# Patient Record
Sex: FEMALE | Race: WHITE | NOT HISPANIC OR LATINO | Employment: STUDENT | RURAL
[De-identification: names, ages, dates, MRNs, and addresses within clinical notes are randomized per-mention and may not be internally consistent; named-entity substitution may affect disease eponyms.]

---
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
## 2022-01-22 ENCOUNTER — OFFICE VISIT (OUTPATIENT)
Dept: FAMILY MEDICINE | Facility: CLINIC | Age: 22
End: 2022-01-22
Payer: COMMERCIAL

## 2022-01-22 ENCOUNTER — LAB VISIT (OUTPATIENT)
Dept: FAMILY MEDICINE | Facility: CLINIC | Age: 22
End: 2022-01-22
Payer: COMMERCIAL

## 2022-01-22 VITALS
TEMPERATURE: 99 F | WEIGHT: 140 LBS | OXYGEN SATURATION: 98 % | SYSTOLIC BLOOD PRESSURE: 107 MMHG | BODY MASS INDEX: 23.32 KG/M2 | HEIGHT: 65 IN | HEART RATE: 82 BPM | DIASTOLIC BLOOD PRESSURE: 83 MMHG | RESPIRATION RATE: 16 BRPM

## 2022-01-22 DIAGNOSIS — Z11.52 ENCOUNTER FOR SCREENING FOR COVID-19: Primary | ICD-10-CM

## 2022-01-22 DIAGNOSIS — Z11.52 ENCOUNTER FOR SCREENING FOR COVID-19: ICD-10-CM

## 2022-01-22 DIAGNOSIS — U07.1 COVID-19: Primary | ICD-10-CM

## 2022-01-22 LAB
CTP QC/QA: YES
SARS-COV-2 AG RESP QL IA.RAPID: POSITIVE

## 2022-01-22 PROCEDURE — 99051 MED SERV EVE/WKEND/HOLIDAY: CPT | Mod: ,,, | Performed by: NURSE PRACTITIONER

## 2022-01-22 PROCEDURE — 99051 PR MEDICAL SERVICES, EVE/WKEND/HOLIDAY: ICD-10-PCS | Mod: ,,, | Performed by: NURSE PRACTITIONER

## 2022-01-22 PROCEDURE — 1159F PR MEDICATION LIST DOCUMENTED IN MEDICAL RECORD: ICD-10-PCS | Mod: ,,, | Performed by: NURSE PRACTITIONER

## 2022-01-22 PROCEDURE — 3008F BODY MASS INDEX DOCD: CPT | Mod: ,,, | Performed by: NURSE PRACTITIONER

## 2022-01-22 PROCEDURE — 99213 OFFICE O/P EST LOW 20 MIN: CPT | Mod: ,,, | Performed by: NURSE PRACTITIONER

## 2022-01-22 PROCEDURE — 3074F PR MOST RECENT SYSTOLIC BLOOD PRESSURE < 130 MM HG: ICD-10-PCS | Mod: ,,, | Performed by: NURSE PRACTITIONER

## 2022-01-22 PROCEDURE — 3079F PR MOST RECENT DIASTOLIC BLOOD PRESSURE 80-89 MM HG: ICD-10-PCS | Mod: ,,, | Performed by: NURSE PRACTITIONER

## 2022-01-22 PROCEDURE — 3008F PR BODY MASS INDEX (BMI) DOCUMENTED: ICD-10-PCS | Mod: ,,, | Performed by: NURSE PRACTITIONER

## 2022-01-22 PROCEDURE — 87426 SARSCOV CORONAVIRUS AG IA: CPT | Mod: QW,,, | Performed by: NURSE PRACTITIONER

## 2022-01-22 PROCEDURE — 87426 SARS CORONAVIRUS 2 ANTIGEN POCT: ICD-10-PCS | Mod: QW,,, | Performed by: NURSE PRACTITIONER

## 2022-01-22 PROCEDURE — 1159F MED LIST DOCD IN RCRD: CPT | Mod: ,,, | Performed by: NURSE PRACTITIONER

## 2022-01-22 PROCEDURE — 99213 PR OFFICE/OUTPT VISIT, EST, LEVL III, 20-29 MIN: ICD-10-PCS | Mod: ,,, | Performed by: NURSE PRACTITIONER

## 2022-01-22 PROCEDURE — 3079F DIAST BP 80-89 MM HG: CPT | Mod: ,,, | Performed by: NURSE PRACTITIONER

## 2022-01-22 PROCEDURE — 3074F SYST BP LT 130 MM HG: CPT | Mod: ,,, | Performed by: NURSE PRACTITIONER

## 2022-01-22 RX ORDER — METHYLPREDNISOLONE 4 MG/1
TABLET ORAL
Qty: 21 TABLET | Refills: 0 | Status: SHIPPED | OUTPATIENT
Start: 2022-01-22 | End: 2022-04-26

## 2022-01-22 RX ORDER — ALBUTEROL SULFATE 90 UG/1
2 AEROSOL, METERED RESPIRATORY (INHALATION) EVERY 6 HOURS PRN
Qty: 18 G | Refills: 0 | Status: SHIPPED | OUTPATIENT
Start: 2022-01-22 | End: 2022-04-26

## 2022-01-22 RX ORDER — AZITHROMYCIN 250 MG/1
TABLET, FILM COATED ORAL
Qty: 6 TABLET | Refills: 0 | Status: SHIPPED | OUTPATIENT
Start: 2022-01-22 | End: 2022-01-27

## 2022-01-22 NOTE — LETTER
January 22, 2022      [facility]  [address] [place] [address]  Phone: [phone]  Fax: [phone]       Patient: Ronit Morley   YOB: 2000  Date of Visit: 01/22/2022    To Whom It May Concern:    Yong Morley  was at [facility] on 01/22/2022. The patient may return to work/school on 01/27/2022 with no restrictions. If you have any questions or concerns, or if I can be of further assistance, please do not hesitate to contact me.    Sincerely,    GRIS Melgar

## 2022-01-22 NOTE — PROGRESS NOTES
[facility] - FAMILY MEDICINE  Phone: [phone]       PATIENT NAME: Ronit Morley   : 2000    AGE: 21 y.o. DATE: 2022    MRN: [id number]        Reason for Visit / Chief Complaint:  COVID-19 Concerns (Presents for COVID screening. Was told that she needed to have a COVID test before returning to school. Patient has not tested positive for COVID. Denies any symptoms or known exposure. )     Subjective:     HPI:  21 yr old WF presents to the office for covid testing to return to college   Denies any s/s or known exposure  reoprts she has been vaccinated     Review of Systems:    Pertinent items are above noted in HPI.  A comprehensive review of systems was negative     Review of patient's allergies indicates:   Allergen Reactions    Cefdinir Rash        Med List:  Current Outpatient Medications on File Prior to Visit   Medication Sig Dispense Refill    AVIANE 0.1-20 mg-mcg per tablet Take 1 tablet by mouth once daily 28 tablet 0    citalopram (CELEXA) 10 MG tablet Take 1 tablet by mouth once daily 90 tablet 0    metoprolol succinate (TOPROL-XL) 25 MG 24 hr tablet TAKE 1 TABLET BY MOUTH ONCE DAILY FOR HEART RATE 90 tablet 0     No current facility-administered medications on file prior to visit.       Medical/Social/Family History:  Past Medical History:   Diagnosis Date    Depression     Heart rate fast       Social History     Tobacco Use   Smoking Status Never Smoker   Smokeless Tobacco Never Used      Social History     Substance and Sexual Activity   Alcohol Use Never       History reviewed. No pertinent family history.   Past Surgical History:   Procedure Laterality Date    TYMPANOSTOMY TUBE PLACEMENT          Objective:      Vitals:    22 1024   BP: 107/83   BP Location: Left arm   Patient Position: Sitting   BP Method: Medium (Automatic)   Pulse: 82   Resp: 16   Temp: 99.3 °F (37.4 °C)   TempSrc: Oral   SpO2: 98%   Weight: 63.5 kg (140 lb)  "  Height: 5' 5" (1.651 m)     Body mass index is 23.3 kg/m².     Physical Exam:    General: well developed, well nourished    Head: normocephalic, atraumatic    Eyes: conjunctivae/corneas clear. PERRL.    Mouth/ENT: ENT exam normal, no neck nodes or sinus tendernessmucous membranes moist, pharynx normal without lesions Uvula is midline.     Neck: supple, symmetrical, trachea midline, no adenopathy and thyroid: not enlarged, symmetric, no tenderness/mass/nodules    Respiratory: unlabored respirations, no intercostal retractions or accessory muscle use, clear to auscultation without rales or wheezes    Cardiovascular: normal rate and regular rhythm, S1 and S2 normal, no murmurs noted;    Abdomen: soft, nontender    Musculoskeletal: negative; full range of motion, no tenderness, palpable spasm or pain on motion    Lymphadenopathy: No cervical or supraclavicular adenopathy    Neurological: normal without focal findings and mental status, speech normal, alert and oriented x3    Skin: Skin color, texture, turgor normal. No rashes or lesions    Psych: no auditory or visual hallucinations, no anxiety, no depression/worrying alot       Assessment:          ICD-10-CM ICD-9-CM   1. COVID-19  U07.1 079.89        Plan:       COVID-19  -     methylPREDNISolone (MEDROL DOSEPACK) 4 mg tablet; use as directed  Dispense: 21 tablet; Refill: 0  -     pyrilamine-chlophedianoL 12.5-12.5 mg/5 mL Liqd; Take 10 mLs by mouth every 8 (eight) hours as needed (cough).  Dispense: 240 mL; Refill: 0  -     albuterol (PROAIR HFA) 90 mcg/actuation inhaler; Inhale 2 puffs into the lungs every 6 (six) hours as needed for Wheezing. Rescue  Dispense: 18 g; Refill: 0  -     azithromycin (Z-EMERSON) 250 MG tablet; Take 2 tablets by mouth on day 1; Take 1 tablet by mouth on days 2-5  Dispense: 6 tablet; Refill: 0        Current Outpatient Medications:     AVIANE 0.1-20 mg-mcg per tablet, Take 1 tablet by mouth once daily, Disp: 28 tablet, Rfl: 0    " citalopram (CELEXA) 10 MG tablet, Take 1 tablet by mouth once daily, Disp: 90 tablet, Rfl: 0    metoprolol succinate (TOPROL-XL) 25 MG 24 hr tablet, TAKE 1 TABLET BY MOUTH ONCE DAILY FOR HEART RATE, Disp: 90 tablet, Rfl: 0    albuterol (PROAIR HFA) 90 mcg/actuation inhaler, Inhale 2 puffs into the lungs every 6 (six) hours as needed for Wheezing. Rescue, Disp: 18 g, Rfl: 0    azithromycin (Z-EMERSON) 250 MG tablet, Take 2 tablets by mouth on day 1; Take 1 tablet by mouth on days 2-5, Disp: 6 tablet, Rfl: 0    methylPREDNISolone (MEDROL DOSEPACK) 4 mg tablet, use as directed, Disp: 21 tablet, Rfl: 0    pyrilamine-chlophedianoL 12.5-12.5 mg/5 mL Liqd, Take 10 mLs by mouth every 8 (eight) hours as needed (cough)., Disp: 240 mL, Rfl: 0        New & refilled meds:  Requested Prescriptions     Signed Prescriptions Disp Refills    methylPREDNISolone (MEDROL DOSEPACK) 4 mg tablet 21 tablet 0     Sig: use as directed    pyrilamine-chlophedianoL 12.5-12.5 mg/5 mL Liqd 240 mL 0     Sig: Take 10 mLs by mouth every 8 (eight) hours as needed (cough).    albuterol (PROAIR HFA) 90 mcg/actuation inhaler 18 g 0     Sig: Inhale 2 puffs into the lungs every 6 (six) hours as needed for Wheezing. Rescue    azithromycin (Z-EMERSON) 250 MG tablet 6 tablet 0     Sig: Take 2 tablets by mouth on day 1; Take 1 tablet by mouth on days 2-5     Treatment Recommendations:    Orders and follow up as documented in patient record.  Rest, hydrate, tylenol otc prn, quarantine x 5 days  Return to clinic as needed.    Signature: AMBAR Zuniga

## 2022-03-09 RX ORDER — LEVONORGESTREL AND ETHINYL ESTRADIOL 0.1-0.02MG
1 KIT ORAL DAILY
Qty: 28 TABLET | Refills: 0 | Status: SHIPPED | OUTPATIENT
Start: 2022-03-09 | End: 2022-04-06 | Stop reason: SDUPTHER

## 2022-03-25 RX ORDER — CITALOPRAM 10 MG/1
10 TABLET ORAL DAILY
Qty: 30 TABLET | Refills: 0 | Status: SHIPPED | OUTPATIENT
Start: 2022-03-25 | End: 2023-06-12

## 2022-03-25 NOTE — TELEPHONE ENCOUNTER
----- Message from Mervat Doyle sent at 3/25/2022  8:49 AM CDT -----  Pt needs refill of citalopram (CELEXA) 10 MG tablet sent to [address] [place] [address]. Please call Pt @ [phone]

## 2022-04-06 DIAGNOSIS — Z30.41 SURVEILLANCE OF CONTRACEPTIVE PILL: Primary | ICD-10-CM

## 2022-04-06 RX ORDER — LEVONORGESTREL AND ETHINYL ESTRADIOL 0.1-0.02MG
1 KIT ORAL DAILY
Qty: 28 TABLET | Refills: 0 | Status: SHIPPED | OUTPATIENT
Start: 2022-04-06 | End: 2022-04-06 | Stop reason: ALTCHOICE

## 2022-04-07 ENCOUNTER — TELEPHONE (OUTPATIENT)
Dept: OBSTETRICS AND GYNECOLOGY | Facility: CLINIC | Age: 22
End: 2022-04-07
Payer: COMMERCIAL

## 2022-04-07 NOTE — TELEPHONE ENCOUNTER
----- Message from Sarah Pinto CNM sent at 4/6/2022  5:21 PM CDT -----  Please call her and tell her I sent a refill on Aviane.  She needs to schedule an appt. For a visit before she needs another refill.

## 2022-04-25 ENCOUNTER — OFFICE VISIT (OUTPATIENT)
Dept: OBSTETRICS AND GYNECOLOGY | Facility: CLINIC | Age: 22
End: 2022-04-25
Payer: COMMERCIAL

## 2022-04-25 VITALS
DIASTOLIC BLOOD PRESSURE: 87 MMHG | WEIGHT: 138.63 LBS | BODY MASS INDEX: 23.1 KG/M2 | OXYGEN SATURATION: 99 % | SYSTOLIC BLOOD PRESSURE: 124 MMHG | HEIGHT: 65 IN | HEART RATE: 86 BPM

## 2022-04-25 DIAGNOSIS — Z30.41 SURVEILLANCE OF CONTRACEPTIVE PILL: Primary | ICD-10-CM

## 2022-04-25 PROCEDURE — 99212 PR OFFICE/OUTPT VISIT, EST, LEVL II, 10-19 MIN: ICD-10-PCS | Mod: ,,, | Performed by: ADVANCED PRACTICE MIDWIFE

## 2022-04-25 PROCEDURE — 3008F BODY MASS INDEX DOCD: CPT | Mod: ,,, | Performed by: ADVANCED PRACTICE MIDWIFE

## 2022-04-25 PROCEDURE — 1159F MED LIST DOCD IN RCRD: CPT | Mod: ,,, | Performed by: ADVANCED PRACTICE MIDWIFE

## 2022-04-25 PROCEDURE — 3079F DIAST BP 80-89 MM HG: CPT | Mod: ,,, | Performed by: ADVANCED PRACTICE MIDWIFE

## 2022-04-25 PROCEDURE — 3074F PR MOST RECENT SYSTOLIC BLOOD PRESSURE < 130 MM HG: ICD-10-PCS | Mod: ,,, | Performed by: ADVANCED PRACTICE MIDWIFE

## 2022-04-25 PROCEDURE — 3079F PR MOST RECENT DIASTOLIC BLOOD PRESSURE 80-89 MM HG: ICD-10-PCS | Mod: ,,, | Performed by: ADVANCED PRACTICE MIDWIFE

## 2022-04-25 PROCEDURE — 1159F PR MEDICATION LIST DOCUMENTED IN MEDICAL RECORD: ICD-10-PCS | Mod: ,,, | Performed by: ADVANCED PRACTICE MIDWIFE

## 2022-04-25 PROCEDURE — 3074F SYST BP LT 130 MM HG: CPT | Mod: ,,, | Performed by: ADVANCED PRACTICE MIDWIFE

## 2022-04-25 PROCEDURE — 3008F PR BODY MASS INDEX (BMI) DOCUMENTED: ICD-10-PCS | Mod: ,,, | Performed by: ADVANCED PRACTICE MIDWIFE

## 2022-04-25 PROCEDURE — 99212 OFFICE O/P EST SF 10 MIN: CPT | Mod: ,,, | Performed by: ADVANCED PRACTICE MIDWIFE

## 2022-04-25 RX ORDER — LEVONORGESTREL AND ETHINYL ESTRADIOL 0.1-0.02MG
1 KIT ORAL DAILY
Qty: 30 TABLET | Refills: 11 | Status: SHIPPED | OUTPATIENT
Start: 2022-04-25 | End: 2023-04-28

## 2022-04-25 NOTE — PROGRESS NOTES
"Patient ID:  Ronit Morley is a 21 y.o. female      Chief Complaint:   Chief Complaint   Patient presents with    Contraception        HPI:  Ronit is in for refill on OCP. She denies ACHES, vag discharge, and abnormal vag bleeding. No complaints voiced. She is a nursing student at [employer] [place]   LMP/contraceptive: Patient's last menstrual period was 2022. On Aviane OCP, desires to continue.  Sexually active:  Yes, declines STD testing      Past Medical History:   Diagnosis Date    Depression     Heart rate fast      Past Surgical History:   Procedure Laterality Date    TYMPANOSTOMY TUBE PLACEMENT         OB History        0    Para   0    Term   0       0    AB   0    Living   0       SAB   0    IAB   0    Ectopic   0    Multiple   0    Live Births   0                 /87   Pulse 86   Ht 5' 5" (1.651 m)   Wt 62.9 kg (138 lb 9.6 oz)   LMP 2022   SpO2 99%   BMI 23.06 kg/m²   Wt Readings from Last 3 Encounters:   22 62.9 kg (138 lb 9.6 oz)   22 63.5 kg (140 lb)   21 61.7 kg (136 lb)          ROS:  Review of Systems   Constitutional: Negative.    Eyes: Negative.    Respiratory: Negative.    Cardiovascular: Negative.    Gastrointestinal: Negative.    Endocrine: Negative.    Genitourinary: Negative.    Musculoskeletal: Negative.    Integumentary:  Negative.   Neurological: Negative.    Hematological: Negative.    Psychiatric/Behavioral: Negative.    Breast: negative.           PHYSICAL EXAM:  Physical Exam  Constitutional:       Appearance: Normal appearance. She is normal weight.   HENT:      Head: Normocephalic.      Nose: Nose normal.   Eyes:      Extraocular Movements: Extraocular movements intact.   Cardiovascular:      Rate and Rhythm: Normal rate and regular rhythm.      Pulses: Normal pulses.      Heart sounds: Normal heart sounds.   Pulmonary:      Effort: Pulmonary effort is normal.      Breath sounds: Normal breath sounds.   Abdominal:      Palpations: Abdomen " is soft.   Musculoskeletal:         General: Normal range of motion.      Cervical back: Normal range of motion.   Skin:     General: Skin is warm and dry.   Neurological:      Mental Status: She is alert and oriented to person, place, and time.   Psychiatric:         Mood and Affect: Mood normal.         Behavior: Behavior normal.         Thought Content: Thought content normal.         Judgment: Judgment normal.          Assessment:  Surveillance of contraceptive pill  -     levonorgestrel-ethinyl estradiol (AVIANE) 0.1-20 mg-mcg per tablet; Take 1 tablet by mouth once daily.  Dispense: 30 tablet; Refill: 11          ICD-10-CM ICD-9-CM    1. Surveillance of contraceptive pill  Z30.41 V25.41 levonorgestrel-ethinyl estradiol (AVIANE) 0.1-20 mg-mcg per tablet       Plan:  Refills of Aviane OCP sent. Reviewed ACHES and possible s/e  Encouraged use of condoms with each sexual encounter    Follow up in about 1 year (around 4/25/2023) for annaual and surveillance of OCP.

## 2022-04-26 ENCOUNTER — OFFICE VISIT (OUTPATIENT)
Dept: FAMILY MEDICINE | Facility: CLINIC | Age: 22
End: 2022-04-26
Payer: COMMERCIAL

## 2022-04-26 VITALS
DIASTOLIC BLOOD PRESSURE: 85 MMHG | RESPIRATION RATE: 18 BRPM | TEMPERATURE: 99 F | SYSTOLIC BLOOD PRESSURE: 117 MMHG | OXYGEN SATURATION: 99 % | BODY MASS INDEX: 23.03 KG/M2 | WEIGHT: 138.38 LBS | HEART RATE: 77 BPM

## 2022-04-26 DIAGNOSIS — K58.1 IRRITABLE BOWEL SYNDROME WITH CONSTIPATION: Primary | ICD-10-CM

## 2022-04-26 PROCEDURE — 3008F BODY MASS INDEX DOCD: CPT | Mod: ,,, | Performed by: NURSE PRACTITIONER

## 2022-04-26 PROCEDURE — 99214 PR OFFICE/OUTPT VISIT, EST, LEVL IV, 30-39 MIN: ICD-10-PCS | Mod: ,,, | Performed by: NURSE PRACTITIONER

## 2022-04-26 PROCEDURE — 1160F PR REVIEW ALL MEDS BY PRESCRIBER/CLIN PHARMACIST DOCUMENTED: ICD-10-PCS | Mod: ,,, | Performed by: NURSE PRACTITIONER

## 2022-04-26 PROCEDURE — 1159F PR MEDICATION LIST DOCUMENTED IN MEDICAL RECORD: ICD-10-PCS | Mod: ,,, | Performed by: NURSE PRACTITIONER

## 2022-04-26 PROCEDURE — 3074F PR MOST RECENT SYSTOLIC BLOOD PRESSURE < 130 MM HG: ICD-10-PCS | Mod: ,,, | Performed by: NURSE PRACTITIONER

## 2022-04-26 PROCEDURE — 3079F PR MOST RECENT DIASTOLIC BLOOD PRESSURE 80-89 MM HG: ICD-10-PCS | Mod: ,,, | Performed by: NURSE PRACTITIONER

## 2022-04-26 PROCEDURE — 3074F SYST BP LT 130 MM HG: CPT | Mod: ,,, | Performed by: NURSE PRACTITIONER

## 2022-04-26 PROCEDURE — 3008F PR BODY MASS INDEX (BMI) DOCUMENTED: ICD-10-PCS | Mod: ,,, | Performed by: NURSE PRACTITIONER

## 2022-04-26 PROCEDURE — 99214 OFFICE O/P EST MOD 30 MIN: CPT | Mod: ,,, | Performed by: NURSE PRACTITIONER

## 2022-04-26 PROCEDURE — 1160F RVW MEDS BY RX/DR IN RCRD: CPT | Mod: ,,, | Performed by: NURSE PRACTITIONER

## 2022-04-26 PROCEDURE — 3079F DIAST BP 80-89 MM HG: CPT | Mod: ,,, | Performed by: NURSE PRACTITIONER

## 2022-04-26 PROCEDURE — 1159F MED LIST DOCD IN RCRD: CPT | Mod: ,,, | Performed by: NURSE PRACTITIONER

## 2022-04-26 NOTE — PROGRESS NOTES
Kellee Santiago DNP, GRIS    [address] [place] [address]     PATIENT NAME: Ronit Morley  : 2000  DATE: 22  MRN: [id number]      Billing Provider: Kellee Santiago DNP, GRIS  Level of Service:   Patient PCP Information     Provider PCP Type    GRIS [facility]          Reason for Visit / Chief Complaint: Constipation (Patient complains of constipation and abdominal pain. She states the abdominal pain in usually on the right side, but sometimes on the left. She states she has tried every laxative she knows of and nothing helps at all. She has had this constipation on and off for a few months now. A couple times she has noticed blood in her stool.)       Update PCP  Update Chief Complaint         History of Present Illness / Problem Focused Workflow     Ronit Morley presents to the clinic with Constipation (Patient complains of constipation and abdominal pain. She states the abdominal pain in usually on the right side, but sometimes on the left. She states she has tried every laxative she knows of and nothing helps at all. She has had this constipation on and off for a few months now. A couple times she has noticed blood in her stool.)     Pt c/o constipation that has worsened over the past 4 months. Pt has tried over the counter meds including stool softeners and miralax. Pt continues to have difficulty having a bowel movement.     Mom and sister have hx IBS-C.       Review of Systems     Review of Systems   Constitutional: Negative for activity change, appetite change, chills, fatigue and fever.   HENT: Negative for nasal congestion, ear pain, hearing loss, postnasal drip and sore throat.    Respiratory: Negative for cough, chest tightness, shortness of breath and wheezing.    Cardiovascular: Negative for chest pain, palpitations, leg swelling and claudication.   Gastrointestinal: Positive for abdominal pain, blood in stool and constipation.  Negative for change in bowel habit, diarrhea, nausea, vomiting and change in bowel habit.   Genitourinary: Negative for dysuria.   Musculoskeletal: Negative for arthralgias, back pain, gait problem and myalgias.   Integumentary:  Negative for rash.   Neurological: Negative for weakness and headaches.   Psychiatric/Behavioral: Negative for suicidal ideas. The patient is not nervous/anxious.         Medical / Social / Family History     Past Medical History:   Diagnosis Date    Depression     Heart rate fast        Past Surgical History:   Procedure Laterality Date    TYMPANOSTOMY TUBE PLACEMENT         Social History  Ms. Ronit Morley  reports that she has never smoked. She has never used smokeless tobacco. She reports that she does not drink alcohol and does not use drugs.    Family History  Ms. Ronit Morley's family history is not on file.    Medications and Allergies     Medications  Outpatient Medications Marked as Taking for the 4/26/22 encounter (Office Visit) with Kellee Santiago, DORIS, FNP   Medication Sig Dispense Refill    citalopram (CELEXA) 10 MG tablet Take 1 tablet (10 mg total) by mouth once daily. 30 tablet 0    levonorgestrel-ethinyl estradiol (AVIANE) 0.1-20 mg-mcg per tablet Take 1 tablet by mouth once daily. 30 tablet 11    metoprolol succinate (TOPROL-XL) 25 MG 24 hr tablet TAKE 1 TABLET BY MOUTH ONCE DAILY FOR HEART RATE 90 tablet 0       Allergies  Review of patient's allergies indicates:   Allergen Reactions    Cefdinir Rash       Physical Examination     Vitals:    04/26/22 0834   BP: 117/85   Pulse: 77   Resp: 18   Temp: 98.8 °F (37.1 °C)     Physical Exam  Vitals and nursing note reviewed.   Constitutional:       General: She is not in acute distress.     Appearance: Normal appearance. She is not ill-appearing.   HENT:      Nose: Nose normal.      Mouth/Throat:      Mouth: Mucous membranes are moist.   Eyes:      Extraocular Movements: Extraocular movements intact.      Pupils: Pupils  are equal, round, and reactive to light.   Cardiovascular:      Rate and Rhythm: Normal rate and regular rhythm.      Pulses: Normal pulses.      Heart sounds: Normal heart sounds. No murmur heard.  Pulmonary:      Effort: Pulmonary effort is normal. No respiratory distress.      Breath sounds: Normal breath sounds. No wheezing, rhonchi or rales.   Chest:      Chest wall: No tenderness.   Abdominal:      General: Bowel sounds are normal. There is no distension.      Palpations: Abdomen is soft. There is no mass.      Tenderness: There is abdominal tenderness. There is no guarding or rebound.      Hernia: No hernia is present.   Musculoskeletal:         General: Normal range of motion.      Cervical back: Normal range of motion and neck supple.      Right lower leg: No edema.      Left lower leg: No edema.   Skin:     General: Skin is warm and dry.      Findings: No rash.   Neurological:      General: No focal deficit present.      Mental Status: She is alert and oriented to person, place, and time. Mental status is at baseline.   Psychiatric:         Mood and Affect: Mood normal.         Behavior: Behavior normal.          Assessment and Plan (including Health Maintenance)      Problem List  Smart Sets  Document Outside HM   :    Plan:         Health Maintenance Due   Topic Date Due    Hepatitis C Screening  Never done    Lipid Panel  Never done    COVID-19 Vaccine (1) Never done    HPV Vaccines (1 - 2-dose series) Never done    HIV Screening  Never done    Chlamydia Screening  Never done    TETANUS VACCINE  Never done    Influenza Vaccine (1) Never done       Problem List Items Addressed This Visit    None     Visit Diagnoses     Irritable bowel syndrome with constipation    -  Primary    Relevant Medications    linaCLOtide (LINZESS) 145 mcg Cap capsule        Irritable bowel syndrome with constipation  -     linaCLOtide (LINZESS) 145 mcg Cap capsule; Take 1 capsule (145 mcg total) by mouth before  breakfast.  Dispense: 30 capsule; Refill: 1       The patient has no Health Maintenance topics of status Not Due        Future Appointments   Date Time Provider Department Center   5/26/2022  9:30 AM Kellee Santiago DNP, GRIS [facility]        Follow up in about 4 weeks (around 5/24/2022).     Signature:  Kellee Santiago DNP, GRIS  [address] [place] [address]  Phone #: [phone]  Fax #: [phone]    Date of encounter: 4/26/22    Patient Instructions   Drink 1 bottle of magnesium citrate today and repeat tomorrow if no results. Increase water intake. High fiber diet. Take Linzess daily. Follow up in 1 month.

## 2022-04-26 NOTE — PATIENT INSTRUCTIONS
Drink 1 bottle of magnesium citrate today and repeat tomorrow if no results. Increase water intake. High fiber diet. Take Linzess daily. Follow up in 1 month.

## 2022-05-26 ENCOUNTER — OFFICE VISIT (OUTPATIENT)
Dept: FAMILY MEDICINE | Facility: CLINIC | Age: 22
End: 2022-05-26
Payer: COMMERCIAL

## 2022-05-26 VITALS
DIASTOLIC BLOOD PRESSURE: 80 MMHG | HEART RATE: 105 BPM | OXYGEN SATURATION: 100 % | BODY MASS INDEX: 22.82 KG/M2 | WEIGHT: 137 LBS | TEMPERATURE: 97 F | HEIGHT: 65 IN | SYSTOLIC BLOOD PRESSURE: 135 MMHG | RESPIRATION RATE: 17 BRPM

## 2022-05-26 DIAGNOSIS — K58.1 IRRITABLE BOWEL SYNDROME WITH CONSTIPATION: ICD-10-CM

## 2022-05-26 PROCEDURE — 99214 OFFICE O/P EST MOD 30 MIN: CPT | Mod: ,,, | Performed by: NURSE PRACTITIONER

## 2022-05-26 PROCEDURE — 3079F PR MOST RECENT DIASTOLIC BLOOD PRESSURE 80-89 MM HG: ICD-10-PCS | Mod: ,,, | Performed by: NURSE PRACTITIONER

## 2022-05-26 PROCEDURE — 1160F RVW MEDS BY RX/DR IN RCRD: CPT | Mod: ,,, | Performed by: NURSE PRACTITIONER

## 2022-05-26 PROCEDURE — 3008F BODY MASS INDEX DOCD: CPT | Mod: ,,, | Performed by: NURSE PRACTITIONER

## 2022-05-26 PROCEDURE — 3008F PR BODY MASS INDEX (BMI) DOCUMENTED: ICD-10-PCS | Mod: ,,, | Performed by: NURSE PRACTITIONER

## 2022-05-26 PROCEDURE — 3079F DIAST BP 80-89 MM HG: CPT | Mod: ,,, | Performed by: NURSE PRACTITIONER

## 2022-05-26 PROCEDURE — 1160F PR REVIEW ALL MEDS BY PRESCRIBER/CLIN PHARMACIST DOCUMENTED: ICD-10-PCS | Mod: ,,, | Performed by: NURSE PRACTITIONER

## 2022-05-26 PROCEDURE — 1159F PR MEDICATION LIST DOCUMENTED IN MEDICAL RECORD: ICD-10-PCS | Mod: ,,, | Performed by: NURSE PRACTITIONER

## 2022-05-26 PROCEDURE — 99214 PR OFFICE/OUTPT VISIT, EST, LEVL IV, 30-39 MIN: ICD-10-PCS | Mod: ,,, | Performed by: NURSE PRACTITIONER

## 2022-05-26 PROCEDURE — 1159F MED LIST DOCD IN RCRD: CPT | Mod: ,,, | Performed by: NURSE PRACTITIONER

## 2022-05-26 PROCEDURE — 3075F SYST BP GE 130 - 139MM HG: CPT | Mod: ,,, | Performed by: NURSE PRACTITIONER

## 2022-05-26 PROCEDURE — 3075F PR MOST RECENT SYSTOLIC BLOOD PRESS GE 130-139MM HG: ICD-10-PCS | Mod: ,,, | Performed by: NURSE PRACTITIONER

## 2022-05-26 NOTE — PROGRESS NOTES
Kellee Santiago DNP, GRIS    [address] [place] [address]     PATIENT NAME: Ronit Morley  : 2000  DATE: 22  MRN: [id number]      Billing Provider: Kellee Santiago DNP, FNP  Level of Service:   Patient PCP Information     Provider PCP Type    GRIS [facility]          Reason for Visit / Chief Complaint: Follow-up (Patient is here today for a follow up for new medication she is taking  )       Update PCP  Update Chief Complaint         History of Present Illness / Problem Focused Workflow     Ronit Morley presents to the clinic with Follow-up (Patient is here today for a follow up for new medication she is taking  )     Pt here for follow up after starting Linzess 145 mg. Pt states med has worked well when she was taking every day for approx 2 weeks. Pt then went on vacation and did not take med. Pt has started back approx 2 days ago.       Review of Systems     Review of Systems   Constitutional: Negative for activity change and unexpected weight change.   HENT: Negative for hearing loss, rhinorrhea and trouble swallowing.    Eyes: Negative for discharge and visual disturbance.   Respiratory: Negative for chest tightness and wheezing.    Cardiovascular: Negative for chest pain and palpitations.   Gastrointestinal: Positive for constipation. Negative for blood in stool, diarrhea and vomiting.   Endocrine: Negative for polydipsia and polyuria.   Genitourinary: Negative for difficulty urinating, dysuria, hematuria and menstrual problem.   Musculoskeletal: Negative for arthralgias, joint swelling and neck pain.   Neurological: Negative for weakness and headaches.   Psychiatric/Behavioral: Negative for confusion and dysphoric mood.        Medical / Social / Family History     Past Medical History:   Diagnosis Date    Depression     Heart rate fast        Past Surgical History:   Procedure Laterality Date    TYMPANOSTOMY TUBE PLACEMENT          Social History  Ms. Ronit Morley  reports that she has never smoked. She has never used smokeless tobacco. She reports that she does not drink alcohol and does not use drugs.    Family History  Ms. Ronit Morley's family history is not on file.    Medications and Allergies     Medications  Outpatient Medications Marked as Taking for the 5/26/22 encounter (Office Visit) with Kellee Santiago, DORIS, FNP   Medication Sig Dispense Refill    citalopram (CELEXA) 10 MG tablet Take 1 tablet (10 mg total) by mouth once daily. 30 tablet 0    levonorgestrel-ethinyl estradiol (AVIANE) 0.1-20 mg-mcg per tablet Take 1 tablet by mouth once daily. 30 tablet 11    metoprolol succinate (TOPROL-XL) 25 MG 24 hr tablet TAKE 1 TABLET BY MOUTH ONCE DAILY FOR HEART RATE 90 tablet 0    [DISCONTINUED] linaCLOtide (LINZESS) 145 mcg Cap capsule Take 1 capsule (145 mcg total) by mouth before breakfast. 30 capsule 1       Allergies  Review of patient's allergies indicates:   Allergen Reactions    Cefdinir Rash       Physical Examination     Vitals:    05/26/22 0928   BP: 135/80   Pulse: 105   Resp: 17   Temp: 97 °F (36.1 °C)     Physical Exam  Vitals and nursing note reviewed.   Constitutional:       General: She is not in acute distress.  HENT:      Nose: Nose normal.      Mouth/Throat:      Mouth: Mucous membranes are moist.   Eyes:      Pupils: Pupils are equal, round, and reactive to light.   Cardiovascular:      Rate and Rhythm: Normal rate and regular rhythm.      Pulses: Normal pulses.      Heart sounds: Normal heart sounds. No murmur heard.  Pulmonary:      Effort: Pulmonary effort is normal. No respiratory distress.      Breath sounds: Normal breath sounds. No wheezing, rhonchi or rales.   Chest:      Chest wall: No tenderness.   Abdominal:      General: Bowel sounds are normal.      Palpations: Abdomen is soft.      Tenderness: There is abdominal tenderness (mild tenderness).   Musculoskeletal:         General: Normal range of  motion.      Cervical back: Normal range of motion and neck supple.      Right lower leg: No edema.      Left lower leg: No edema.   Skin:     General: Skin is warm and dry.   Neurological:      General: No focal deficit present.      Mental Status: She is alert and oriented to person, place, and time.          Assessment and Plan (including Health Maintenance)      Problem List  Smart Sets  Document Outside HM   :    Plan:         Health Maintenance Due   Topic Date Due    TETANUS VACCINE  Never done    COVID-19 Vaccine (3 - Booster) 01/23/2022       Problem List Items Addressed This Visit    None     Visit Diagnoses     Irritable bowel syndrome with constipation        Relevant Medications    linaCLOtide (LINZESS) 145 mcg Cap capsule        Irritable bowel syndrome with constipation  -     linaCLOtide (LINZESS) 145 mcg Cap capsule; Take 1 capsule (145 mcg total) by mouth before breakfast.  Dispense: 90 capsule; Refill: 1       Health Maintenance Topics with due status: Not Due       Topic Last Completion Date    Influenza Vaccine Not Due       Procedures     Future Appointments   Date Time Provider Department Center   12/7/2022  9:30 AM Kellee Santiago DNP, GRIS [facility]        Follow up in about 6 months (around 11/26/2022).     Signature:  Kellee Santiago DNP, GRIS  [address] [place] [address]  Phone #: [phone]  Fax #: [phone]    Date of encounter: 5/26/22    Patient Instructions   Recommend adding otc probiotic to Linzess. Increase water intake.

## 2023-04-28 ENCOUNTER — OFFICE VISIT (OUTPATIENT)
Dept: OBSTETRICS AND GYNECOLOGY | Facility: CLINIC | Age: 23
End: 2023-04-28
Payer: COMMERCIAL

## 2023-04-28 VITALS
BODY MASS INDEX: 23.69 KG/M2 | OXYGEN SATURATION: 98 % | HEIGHT: 65 IN | SYSTOLIC BLOOD PRESSURE: 116 MMHG | HEART RATE: 102 BPM | WEIGHT: 142.19 LBS | TEMPERATURE: 98 F | DIASTOLIC BLOOD PRESSURE: 78 MMHG

## 2023-04-28 DIAGNOSIS — Z30.015 ENCOUNTER FOR INITIAL PRESCRIPTION OF VAGINAL RING HORMONAL CONTRACEPTIVE: Primary | ICD-10-CM

## 2023-04-28 DIAGNOSIS — F45.41 STRESS HEADACHES: ICD-10-CM

## 2023-04-28 PROCEDURE — 3008F BODY MASS INDEX DOCD: CPT | Mod: ,,, | Performed by: ADVANCED PRACTICE MIDWIFE

## 2023-04-28 PROCEDURE — 99213 PR OFFICE/OUTPT VISIT, EST, LEVL III, 20-29 MIN: ICD-10-PCS | Mod: ,,, | Performed by: ADVANCED PRACTICE MIDWIFE

## 2023-04-28 PROCEDURE — 1159F PR MEDICATION LIST DOCUMENTED IN MEDICAL RECORD: ICD-10-PCS | Mod: ,,, | Performed by: ADVANCED PRACTICE MIDWIFE

## 2023-04-28 PROCEDURE — 3078F PR MOST RECENT DIASTOLIC BLOOD PRESSURE < 80 MM HG: ICD-10-PCS | Mod: ,,, | Performed by: ADVANCED PRACTICE MIDWIFE

## 2023-04-28 PROCEDURE — 1159F MED LIST DOCD IN RCRD: CPT | Mod: ,,, | Performed by: ADVANCED PRACTICE MIDWIFE

## 2023-04-28 PROCEDURE — 3008F PR BODY MASS INDEX (BMI) DOCUMENTED: ICD-10-PCS | Mod: ,,, | Performed by: ADVANCED PRACTICE MIDWIFE

## 2023-04-28 PROCEDURE — 99213 OFFICE O/P EST LOW 20 MIN: CPT | Mod: ,,, | Performed by: ADVANCED PRACTICE MIDWIFE

## 2023-04-28 PROCEDURE — 3078F DIAST BP <80 MM HG: CPT | Mod: ,,, | Performed by: ADVANCED PRACTICE MIDWIFE

## 2023-04-28 PROCEDURE — 3074F SYST BP LT 130 MM HG: CPT | Mod: ,,, | Performed by: ADVANCED PRACTICE MIDWIFE

## 2023-04-28 PROCEDURE — 3074F PR MOST RECENT SYSTOLIC BLOOD PRESSURE < 130 MM HG: ICD-10-PCS | Mod: ,,, | Performed by: ADVANCED PRACTICE MIDWIFE

## 2023-04-28 RX ORDER — ETONOGESTREL AND ETHINYL ESTRADIOL VAGINAL RING .015; .12 MG/D; MG/D
1 RING VAGINAL
Qty: 1 EACH | Refills: 11 | Status: SHIPPED | OUTPATIENT
Start: 2023-04-28 | End: 2024-04-27

## 2023-04-28 RX ORDER — VIT C/E/ZN/COPPR/LUTEIN/ZEAXAN 250MG-90MG
1000 CAPSULE ORAL DAILY
Qty: 30 CAPSULE | Refills: 11 | Status: SHIPPED | OUTPATIENT
Start: 2023-04-28 | End: 2023-06-12

## 2023-04-29 NOTE — PROGRESS NOTES
"Patient ID:  Ronit Morley is a 22 y.o. female      Chief Complaint:   Chief Complaint   Patient presents with    Contraception     Ring         HPI:  Ronit is in to request changing from OCPs to NuvaRing. 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 Currently taking pills, has no problems, feels ring will be easier to manage with her schedule. Denies ACHES, vag discharge, and abnormal vag bleeding. Reports frequent headaches which she attributes to stress.   LMP: Patient's last menstrual period was 2023.   Sexually active:  yes, declines STD testing  Contraceptive: switching from pills to ring      Past Medical History:   Diagnosis Date    Depression     Heart rate fast      Past Surgical History:   Procedure Laterality Date    TYMPANOSTOMY TUBE PLACEMENT         OB History          0    Para   0    Term   0       0    AB   0    Living   0         SAB   0    IAB   0    Ectopic   0    Multiple   0    Live Births   0                 /78 (BP Location: Right arm, Patient Position: Sitting, BP Method: Large (Manual))   Pulse 102   Temp 98 °F (36.7 °C) (Oral)   Ht 5' 5" (1.651 m)   Wt 64.5 kg (142 lb 3.2 oz)   LMP 2023   SpO2 98%   BMI 23.66 kg/m²   Wt Readings from Last 3 Encounters:   23 64.5 kg (142 lb 3.2 oz)   22 62.1 kg (137 lb)   22 62.8 kg (138 lb 6.4 oz)          ROS:  Review of Systems   Constitutional: Negative.    Eyes: Negative.    Respiratory: Negative.     Cardiovascular: Negative.    Gastrointestinal: Negative.    Genitourinary: Negative.    Musculoskeletal: Negative.    Neurological: Negative.    Psychiatric/Behavioral: Negative.          PHYSICAL EXAM:  Physical Exam  Constitutional:       Appearance: Normal appearance. She is normal weight.   Eyes:      Extraocular Movements: Extraocular movements intact.   Cardiovascular:      Rate and Rhythm: Normal rate.      Pulses: Normal pulses.   Pulmonary:      Effort: Pulmonary effort " is normal.   Abdominal:      Palpations: Abdomen is soft.   Musculoskeletal:         General: Normal range of motion.      Cervical back: Normal range of motion.   Skin:     General: Skin is warm and dry.   Neurological:      Mental Status: She is alert and oriented to person, place, and time.   Psychiatric:         Mood and Affect: Mood normal.         Behavior: Behavior normal.         Thought Content: Thought content normal.         Judgment: Judgment normal.        Assessment:  Ronit was seen today for contraception.    Diagnoses and all orders for this visit:    Encounter for initial prescription of vaginal ring hormonal contraceptive  -     etonogestreL-ethinyl estradioL (NUVARING) 0.12-0.015 mg/24 hr vaginal ring; Place 1 each vaginally every 21 days. Insert one (1) ring vaginally and leave in place for three (3) weeks, then remove for one (1) week.    Stress headaches  -     cholecalciferol, vitamin D3, (VITAMIN D3) 25 mcg (1,000 unit) capsule; Take 1 capsule (1,000 Units total) by mouth once daily.    BMI 23.0-23.9, adult          ICD-10-CM ICD-9-CM    1. Encounter for initial prescription of vaginal ring hormonal contraceptive  Z30.015 V25.02 etonogestreL-ethinyl estradioL (NUVARING) 0.12-0.015 mg/24 hr vaginal ring      2. Stress headaches  F45.41 307.81 cholecalciferol, vitamin D3, (VITAMIN D3) 25 mcg (1,000 unit) capsule      3. BMI 23.0-23.9, adult  Z68.23 V85.1           Plan:  Discussed headache management with Vitamin D3 and magnesium supplements daily  Rx sent for Vitamin D3 supplements  Rx sent for NuvaRing, printed fact sheet given, reviewed r/b and possible s/e  Encouraged follow up if headaches worsen or do not improve    Follow up for care prn.

## 2023-06-12 ENCOUNTER — OFFICE VISIT (OUTPATIENT)
Dept: FAMILY MEDICINE | Facility: CLINIC | Age: 23
End: 2023-06-12
Payer: COMMERCIAL

## 2023-06-12 VITALS
HEART RATE: 103 BPM | DIASTOLIC BLOOD PRESSURE: 91 MMHG | OXYGEN SATURATION: 99 % | SYSTOLIC BLOOD PRESSURE: 140 MMHG | HEIGHT: 65 IN | TEMPERATURE: 98 F | WEIGHT: 137.19 LBS | RESPIRATION RATE: 14 BRPM | BODY MASS INDEX: 22.86 KG/M2

## 2023-06-12 DIAGNOSIS — R03.0 ELEVATED BLOOD PRESSURE READING IN OFFICE WITHOUT DIAGNOSIS OF HYPERTENSION: Primary | ICD-10-CM

## 2023-06-12 DIAGNOSIS — D64.9 ANEMIA, UNSPECIFIED TYPE: ICD-10-CM

## 2023-06-12 DIAGNOSIS — E03.9 HYPOTHYROIDISM, UNSPECIFIED TYPE: ICD-10-CM

## 2023-06-12 DIAGNOSIS — N30.00 ACUTE CYSTITIS WITHOUT HEMATURIA: ICD-10-CM

## 2023-06-12 LAB
25(OH)D3 SERPL-MCNC: 20.3 NG/ML
ANION GAP SERPL CALCULATED.3IONS-SCNC: 11 MMOL/L (ref 7–16)
B-HCG UR QL: NEGATIVE
BASOPHILS # BLD AUTO: 0.02 K/UL (ref 0–0.2)
BASOPHILS NFR BLD AUTO: 0.3 % (ref 0–1)
BILIRUB SERPL-MCNC: NEGATIVE MG/DL
BLOOD URINE, POC: NORMAL
BUN SERPL-MCNC: 9 MG/DL (ref 7–18)
BUN/CREAT SERPL: 14 (ref 6–20)
CALCIUM SERPL-MCNC: 9.5 MG/DL (ref 8.5–10.1)
CHLORIDE SERPL-SCNC: 109 MMOL/L (ref 98–107)
CLARITY, POC UA: CLEAR
CO2 SERPL-SCNC: 23 MMOL/L (ref 21–32)
COLOR, POC UA: YELLOW
CREAT SERPL-MCNC: 0.64 MG/DL (ref 0.55–1.02)
CTP QC/QA: YES
DIFFERENTIAL METHOD BLD: NORMAL
EGFR (NO RACE VARIABLE) (RUSH/TITUS): 130 ML/MIN/1.73M2
EOSINOPHIL # BLD AUTO: 0.06 K/UL (ref 0–0.5)
EOSINOPHIL NFR BLD AUTO: 1 % (ref 1–4)
ERYTHROCYTE [DISTWIDTH] IN BLOOD BY AUTOMATED COUNT: 12.3 % (ref 11.5–14.5)
GLUCOSE SERPL-MCNC: 83 MG/DL (ref 74–106)
GLUCOSE UR QL STRIP: NEGATIVE
HCT VFR BLD AUTO: 41.5 % (ref 38–47)
HGB BLD-MCNC: 13.7 G/DL (ref 12–16)
IMM GRANULOCYTES # BLD AUTO: 0.02 K/UL (ref 0–0.04)
IMM GRANULOCYTES NFR BLD: 0.3 % (ref 0–0.4)
KETONES UR QL STRIP: NEGATIVE
LEUKOCYTE ESTERASE URINE, POC: NORMAL
LYMPHOCYTES # BLD AUTO: 1.91 K/UL (ref 1–4.8)
LYMPHOCYTES NFR BLD AUTO: 31.6 % (ref 27–41)
MCH RBC QN AUTO: 29.5 PG (ref 27–31)
MCHC RBC AUTO-ENTMCNC: 33 G/DL (ref 32–36)
MCV RBC AUTO: 89.2 FL (ref 80–96)
MONOCYTES # BLD AUTO: 0.35 K/UL (ref 0–0.8)
MONOCYTES NFR BLD AUTO: 5.8 % (ref 2–6)
MPC BLD CALC-MCNC: 11.3 FL (ref 9.4–12.4)
NEUTROPHILS # BLD AUTO: 3.68 K/UL (ref 1.8–7.7)
NEUTROPHILS NFR BLD AUTO: 61 % (ref 53–65)
NITRITE, POC UA: NEGATIVE
NRBC # BLD AUTO: 0 X10E3/UL
NRBC, AUTO (.00): 0 %
PH, POC UA: 5.5
PLATELET # BLD AUTO: 279 K/UL (ref 150–400)
POTASSIUM SERPL-SCNC: 4.5 MMOL/L (ref 3.5–5.1)
PROTEIN, POC: NORMAL
RBC # BLD AUTO: 4.65 M/UL (ref 4.2–5.4)
SODIUM SERPL-SCNC: 138 MMOL/L (ref 136–145)
SPECIFIC GRAVITY, POC UA: >=1.03
T4 FREE SERPL-MCNC: 1.05 NG/DL (ref 0.76–1.46)
TSH SERPL DL<=0.005 MIU/L-ACNC: 1.88 UIU/ML (ref 0.36–3.74)
UROBILINOGEN, POC UA: 0.2
WBC # BLD AUTO: 6.04 K/UL (ref 4.5–11)

## 2023-06-12 PROCEDURE — 3077F SYST BP >= 140 MM HG: CPT | Mod: ,,, | Performed by: NURSE PRACTITIONER

## 2023-06-12 PROCEDURE — 84443 TSH: ICD-10-PCS | Mod: ,,, | Performed by: CLINICAL MEDICAL LABORATORY

## 2023-06-12 PROCEDURE — 81002 POCT URINE DIPSTICK WITHOUT MICROSCOPE: ICD-10-PCS | Mod: ,,, | Performed by: NURSE PRACTITIONER

## 2023-06-12 PROCEDURE — 1159F MED LIST DOCD IN RCRD: CPT | Mod: ,,, | Performed by: NURSE PRACTITIONER

## 2023-06-12 PROCEDURE — 81002 URINALYSIS NONAUTO W/O SCOPE: CPT | Mod: ,,, | Performed by: NURSE PRACTITIONER

## 2023-06-12 PROCEDURE — 84443 ASSAY THYROID STIM HORMONE: CPT | Mod: ,,, | Performed by: CLINICAL MEDICAL LABORATORY

## 2023-06-12 PROCEDURE — 82306 VITAMIN D 25 HYDROXY: CPT | Mod: ,,, | Performed by: CLINICAL MEDICAL LABORATORY

## 2023-06-12 PROCEDURE — 1160F RVW MEDS BY RX/DR IN RCRD: CPT | Mod: ,,, | Performed by: NURSE PRACTITIONER

## 2023-06-12 PROCEDURE — 84439 T4, FREE: ICD-10-PCS | Mod: ,,, | Performed by: CLINICAL MEDICAL LABORATORY

## 2023-06-12 PROCEDURE — 3008F BODY MASS INDEX DOCD: CPT | Mod: ,,, | Performed by: NURSE PRACTITIONER

## 2023-06-12 PROCEDURE — 99214 OFFICE O/P EST MOD 30 MIN: CPT | Mod: ,,, | Performed by: NURSE PRACTITIONER

## 2023-06-12 PROCEDURE — 80048 BASIC METABOLIC PNL TOTAL CA: CPT | Mod: ,,, | Performed by: CLINICAL MEDICAL LABORATORY

## 2023-06-12 PROCEDURE — 85025 COMPLETE CBC W/AUTO DIFF WBC: CPT | Mod: ,,, | Performed by: CLINICAL MEDICAL LABORATORY

## 2023-06-12 PROCEDURE — 81025 URINE PREGNANCY TEST: CPT | Mod: QW,,, | Performed by: NURSE PRACTITIONER

## 2023-06-12 PROCEDURE — 81025 POCT URINE PREGNANCY: ICD-10-PCS | Mod: QW,,, | Performed by: NURSE PRACTITIONER

## 2023-06-12 PROCEDURE — 85025 CBC WITH DIFFERENTIAL: ICD-10-PCS | Mod: ,,, | Performed by: CLINICAL MEDICAL LABORATORY

## 2023-06-12 PROCEDURE — 82306 VITAMIN D: ICD-10-PCS | Mod: ,,, | Performed by: CLINICAL MEDICAL LABORATORY

## 2023-06-12 PROCEDURE — 87086 CULTURE, URINE: ICD-10-PCS | Mod: ,,, | Performed by: CLINICAL MEDICAL LABORATORY

## 2023-06-12 PROCEDURE — 3080F PR MOST RECENT DIASTOLIC BLOOD PRESSURE >= 90 MM HG: ICD-10-PCS | Mod: ,,, | Performed by: NURSE PRACTITIONER

## 2023-06-12 PROCEDURE — 87086 URINE CULTURE/COLONY COUNT: CPT | Mod: ,,, | Performed by: CLINICAL MEDICAL LABORATORY

## 2023-06-12 PROCEDURE — 1159F PR MEDICATION LIST DOCUMENTED IN MEDICAL RECORD: ICD-10-PCS | Mod: ,,, | Performed by: NURSE PRACTITIONER

## 2023-06-12 PROCEDURE — 84439 ASSAY OF FREE THYROXINE: CPT | Mod: ,,, | Performed by: CLINICAL MEDICAL LABORATORY

## 2023-06-12 PROCEDURE — 3077F PR MOST RECENT SYSTOLIC BLOOD PRESSURE >= 140 MM HG: ICD-10-PCS | Mod: ,,, | Performed by: NURSE PRACTITIONER

## 2023-06-12 PROCEDURE — 80048 BASIC METABOLIC PANEL: ICD-10-PCS | Mod: ,,, | Performed by: CLINICAL MEDICAL LABORATORY

## 2023-06-12 PROCEDURE — 3080F DIAST BP >= 90 MM HG: CPT | Mod: ,,, | Performed by: NURSE PRACTITIONER

## 2023-06-12 PROCEDURE — 3008F PR BODY MASS INDEX (BMI) DOCUMENTED: ICD-10-PCS | Mod: ,,, | Performed by: NURSE PRACTITIONER

## 2023-06-12 PROCEDURE — 1160F PR REVIEW ALL MEDS BY PRESCRIBER/CLIN PHARMACIST DOCUMENTED: ICD-10-PCS | Mod: ,,, | Performed by: NURSE PRACTITIONER

## 2023-06-12 PROCEDURE — 99214 PR OFFICE/OUTPT VISIT, EST, LEVL IV, 30-39 MIN: ICD-10-PCS | Mod: ,,, | Performed by: NURSE PRACTITIONER

## 2023-06-12 NOTE — PROGRESS NOTES
GRIS Broussard    [address] [place] [address]     PATIENT NAME: Ronit Morley  : 2000  DATE: 23  MRN: [id number]      Billing Provider: GRIS Broussard  Level of Service: SC OFFICE/OUTPT VISIT, EST, LEVL IV, 30-39 MIN    ASSESSMENT AND PLAN:      Problem List Items Addressed This Visit          Cardiac/Vascular    Elevated blood pressure reading in office without diagnosis of hypertension - Primary    Current Assessment & Plan     New problem  Monitor blood pressure at home, not same time each day nor same arm and record  Bring to next visit  Goal blood pressure 130/80 or less  Low salt diet reviewed           Relevant Orders    Vitamin D    CBC Auto Differential    TSH    T4, Free    Basic Metabolic Panel    POCT urine dipstick without microscope (Completed)    POCT urine pregnancy (Completed)       Renal/    Acute cystitis without hematuria    Current Assessment & Plan     New problem  Awaiting UC results  Avoid holding urine/ wipe front to back with each void  Avoid constipation         Relevant Orders    Urine culture       Oncology    Anemia    Current Assessment & Plan     Chronic recurrent problem  Will evaluate lab results and determine need for iron replacement/supplement and when to re-evaluate  Increase diet with iron rich foods  Return for recheck in 8 weeks         Relevant Orders    Vitamin D    CBC Auto Differential    TSH    T4, Free    Basic Metabolic Panel    POCT urine dipstick without microscope (Completed)    POCT urine pregnancy (Completed)       Endocrine    Hypothyroidism    Current Assessment & Plan     Given family history  Awaiting test results to determine levothyroxine dose needing to begin  New problem  Return in 6 weeks for recheck         Relevant Orders    TSH    T4, Free       Health Maintenance Topics with due status: Not Due       Topic Last Completion Date    Influenza Vaccine Not Due     Future  Appointments   Date Time Provider Department Center   7/24/2023  8:30 AM GRIS Garcia [facility] [place] [place]      Follow up in about 6 weeks (around 7/24/2023) for recheck. or sooner as needed.      CHIEF COMPLAINT: Annual Exam (Pt is here due to family history of hypothyroidism. She has been feeling fatigued, her hair is thinning to her, constipation, etc. She wants to check and see if she does have the same issue. She has been feeling like she has had symptoms for the past few months. )           HISTORY OF PRESENT ILLNESS:  Ronit Morley is a 22 y.o. female who presents to the clinic today     Ronit Morley presents to the clinic with Annual Exam (Pt is here due to family history of hypothyroidism. She has been feeling fatigued, her hair is thinning to her, constipation, etc. She wants to check and see if she does have the same issue. She has been feeling like she has had symptoms for the past few months. )     Family history of hypothyroidism  Mom/Sister  Reports medical hx: IBS C/ anxiety  Neither are being treated consistently  Denies being tested in the past for thyroid disease and sister was diagnosed during pregnancy  Reports new method of BC with use of Nuvaring with reports of dyspareunia since administration of this product two months ago  Patient will discuss with gyn            PAST MEDICAL HISTORY:      Past Medical History:   Diagnosis Date    Depression     Heart rate fast      PAST SURGICAL HISTORY:  Past Surgical History:   Procedure Laterality Date    TYMPANOSTOMY TUBE PLACEMENT       SOCIAL HISTORY:  Social History     Socioeconomic History    Marital status: Single   Tobacco Use    Smoking status: Never     Passive exposure: Never    Smokeless tobacco: Never   Substance and Sexual Activity    Alcohol use: Never    Drug use: Never    Sexual activity: Yes     FAMILY HISTORY:       Family History   Problem Relation Age of Onset    Hypothyroidism Mother     Hypothyroidism Sister         ALLERGIES AND MEDICATIONS: updated and reviewed.       Review of patient's allergies indicates:   Allergen Reactions    Cefdinir Rash     Medication List with Changes/Refills   Current Medications    ETONOGESTREL-ETHINYL ESTRADIOL (NUVARING) 0.12-0.015 MG/24 HR VAGINAL RING    Place 1 each vaginally every 21 days. Insert one (1) ring vaginally and leave in place for three (3) weeks, then remove for one (1) week.   Discontinued Medications    CHOLECALCIFEROL, VITAMIN D3, (VITAMIN D3) 25 MCG (1,000 UNIT) CAPSULE    Take 1 capsule (1,000 Units total) by mouth once daily.    CITALOPRAM (CELEXA) 10 MG TABLET    Take 1 tablet (10 mg total) by mouth once daily.    LINACLOTIDE (LINZESS) 145 MCG CAP CAPSULE    Take 1 capsule (145 mcg total) by mouth before breakfast.    METOPROLOL SUCCINATE (TOPROL-XL) 25 MG 24 HR TABLET    TAKE 1 TABLET BY MOUTH ONCE DAILY FOR HEART RATE       SCREENING HISTORY:     Health Maintenance         Date Due Completion Date    Hepatitis C Screening Never done ---    Lipid Panel Never done ---    HPV Vaccines (1 - 2-dose series) Never done ---    Chlamydia Screening Never done ---    TETANUS VACCINE Never done ---    COVID-19 Vaccine (3 - Mixed Product series) 10/18/2021 8/23/2021    HIV Screening 05/26/2028 (Originally 12/2/2017) ---    Influenza Vaccine (Season Ended) 09/01/2023 ---            REVIEW OF SYSTEMS:   Review of Systems   Constitutional: Negative.    HENT: Negative.     Respiratory: Negative.     Cardiovascular: Negative.    Gastrointestinal: Negative.    Endocrine: Negative for cold intolerance, heat intolerance, polydipsia, polyphagia and polyuria.   Genitourinary: Negative.    Musculoskeletal: Negative.    Integumentary:         Hair loss with breakage without alopecia Negative.   Allergic/Immunologic: Positive for environmental allergies.   Neurological: Negative.    Hematological: Negative.    Psychiatric/Behavioral: Negative.         PHYSICAL EXAM:         BP (!) 140/91  "(BP Location: Right arm, Patient Position: Sitting, BP Method: Large (Automatic))   Pulse 103   Temp 98.3 °F (36.8 °C) (Oral)   Resp 14   Ht 5' 5" (1.651 m)   Wt 62.2 kg (137 lb 3.2 oz)   LMP 05/31/2023 (Approximate)   SpO2 99%   BMI 22.83 kg/m²  Patient's last menstrual period was 05/31/2023 (approximate).  Wt Readings from Last 3 Encounters:   06/12/23 62.2 kg (137 lb 3.2 oz)   04/28/23 64.5 kg (142 lb 3.2 oz)   05/26/22 62.1 kg (137 lb) (66 %, Z= 0.41)*     * Growth percentiles are based on [facility] (Girls, 2-20 Years) data.     BP Readings from Last 3 Encounters:   06/12/23 (!) 140/91   04/28/23 116/78   05/26/22 135/80     Estimated body mass index is 22.83 kg/m² as calculated from the following:    Height as of this encounter: 5' 5" (1.651 m).    Weight as of this encounter: 62.2 kg (137 lb 3.2 oz).     Physical Exam  HENT:      Head: Normocephalic.      Right Ear: Tympanic membrane normal.      Left Ear: Tympanic membrane normal.      Nose: Nose normal.      Mouth/Throat:      Mouth: Mucous membranes are moist.   Cardiovascular:      Rate and Rhythm: Normal rate and regular rhythm.      Pulses: Normal pulses.      Heart sounds: Normal heart sounds.      Comments: Orthostatic bp: lying  left arm 129/79  right 138/82 HR 98  Sitting right arm 132/80 hr 105 left 130/83   Standing left 141/92  right 143/80     Pulmonary:      Effort: Pulmonary effort is normal.      Breath sounds: Normal breath sounds.   Abdominal:      General: Bowel sounds are normal.      Palpations: Abdomen is soft.   Musculoskeletal:         General: Normal range of motion.      Cervical back: Normal range of motion and neck supple.   Skin:     General: Skin is warm and dry.      Capillary Refill: Capillary refill takes 2 to 3 seconds.   Neurological:      Mental Status: She is alert and oriented to person, place, and time.   Psychiatric:         Mood and Affect: Mood normal.         Behavior: Behavior normal. "       LABS:     I have reviewed old labs below:  No results found for: WBC, HGB, HCT, MCV, PLT, NA, K, CL, CALCIUM, PHOS, CO2, GLU, BUN, CREATININE, ANIONGAP, ESTGFRAFRICA, EGFRNONAA, PROT, ALBUMIN, BILITOT, ALKPHOS, ALT, AST, INR, CHOL, TRIG, HDL, LDLCALC, TSH, PSA, GLUF, HGBA1C, MICROALBUR        Signature:  Sanna Abernathy [address] [place] [address]  Phone #: [phone]  Fax #: [phone]       Date of encounter: 6/12/23    There are no Patient Instructions on file for this visit.

## 2023-06-12 NOTE — ASSESSMENT & PLAN NOTE
New problem  Monitor blood pressure at home, not same time each day nor same arm and record  Bring to next visit  Goal blood pressure 130/80 or less  Low salt diet reviewed

## 2023-06-12 NOTE — ASSESSMENT & PLAN NOTE
New problem  Awaiting UC results  Avoid holding urine/ wipe front to back with each void  Avoid constipation

## 2023-06-12 NOTE — ASSESSMENT & PLAN NOTE
Chronic recurrent problem  Will evaluate lab results and determine need for iron replacement/supplement and when to re-evaluate  Increase diet with iron rich foods  Return for recheck in 8 weeks

## 2023-06-12 NOTE — ASSESSMENT & PLAN NOTE
Given family history  Awaiting test results to determine levothyroxine dose needing to begin  New problem  Return in 6 weeks for recheck

## 2023-06-13 ENCOUNTER — PATIENT OUTREACH (OUTPATIENT)
Dept: ADMINISTRATIVE | Facility: HOSPITAL | Age: 23
End: 2023-06-13

## 2023-06-13 NOTE — PROGRESS NOTES
Per [facility] website, insurance is active and pt is listed on the attributed list needing a healthy you performed in 2023  Sent to [facility] to schedule health you

## 2023-06-15 LAB — UA COMPLETE W REFLEX CULTURE PNL UR: NORMAL

## 2023-08-01 ENCOUNTER — OFFICE VISIT (OUTPATIENT)
Dept: FAMILY MEDICINE | Facility: CLINIC | Age: 23
End: 2023-08-01
Payer: COMMERCIAL

## 2023-08-01 VITALS
OXYGEN SATURATION: 99 % | HEART RATE: 114 BPM | HEIGHT: 65 IN | DIASTOLIC BLOOD PRESSURE: 78 MMHG | TEMPERATURE: 99 F | BODY MASS INDEX: 22.66 KG/M2 | WEIGHT: 136 LBS | SYSTOLIC BLOOD PRESSURE: 139 MMHG

## 2023-08-01 DIAGNOSIS — Z20.822 CONTACT WITH AND (SUSPECTED) EXPOSURE TO COVID-19: Primary | ICD-10-CM

## 2023-08-01 PROCEDURE — 3075F PR MOST RECENT SYSTOLIC BLOOD PRESS GE 130-139MM HG: ICD-10-PCS | Mod: ,,, | Performed by: NURSE PRACTITIONER

## 2023-08-01 PROCEDURE — 3075F SYST BP GE 130 - 139MM HG: CPT | Mod: ,,, | Performed by: NURSE PRACTITIONER

## 2023-08-01 PROCEDURE — 1159F MED LIST DOCD IN RCRD: CPT | Mod: ,,, | Performed by: NURSE PRACTITIONER

## 2023-08-01 PROCEDURE — 3008F PR BODY MASS INDEX (BMI) DOCUMENTED: ICD-10-PCS | Mod: ,,, | Performed by: NURSE PRACTITIONER

## 2023-08-01 PROCEDURE — 3078F DIAST BP <80 MM HG: CPT | Mod: ,,, | Performed by: NURSE PRACTITIONER

## 2023-08-01 PROCEDURE — 3008F BODY MASS INDEX DOCD: CPT | Mod: ,,, | Performed by: NURSE PRACTITIONER

## 2023-08-01 PROCEDURE — 99212 OFFICE O/P EST SF 10 MIN: CPT | Mod: ,,, | Performed by: NURSE PRACTITIONER

## 2023-08-01 PROCEDURE — 3078F PR MOST RECENT DIASTOLIC BLOOD PRESSURE < 80 MM HG: ICD-10-PCS | Mod: ,,, | Performed by: NURSE PRACTITIONER

## 2023-08-01 PROCEDURE — 1159F PR MEDICATION LIST DOCUMENTED IN MEDICAL RECORD: ICD-10-PCS | Mod: ,,, | Performed by: NURSE PRACTITIONER

## 2023-08-01 PROCEDURE — 99212 PR OFFICE/OUTPT VISIT, EST, LEVL II, 10-19 MIN: ICD-10-PCS | Mod: ,,, | Performed by: NURSE PRACTITIONER

## 2023-08-01 NOTE — PATIENT INSTRUCTIONS
Given exposure to COVID and symptoms  Treat as COVID  Quarantine for 5 days then mask for 5 days. Increase fluid intake and rest! Seek medical attention if symptoms persist or worsen.      Covid is a virus  The current strain of COVID is treated with oral antivirals if you meet criteria  If you do not meet criteria, or wish to not take antivirals, rest, hydrate with water, Tylenol  as needed for fever and body aches, quarantine for 5 days from first day of symptoms  If you are not symptom free after 5 days, return to clinic for recheck  To get the most benefit from a zinc supplement, it is best to take zinc lozenges or syrup within 24 hours of symptoms of cold or virus and take vitamin C supplement  Zinc 40 mg per day is the upper limit for an adult     MUCINEX D 12 hour for sinus pressure   No bruits; no thyromegaly or nodules

## 2023-08-01 NOTE — PROGRESS NOTES
GRIS Broussard    [address] [place] [address]     PATIENT NAME: Ronit Morley  : 2000  DATE: 23  MRN: [id number]      Billing Provider: GRIS Broussard  Level of Service: [place] OFFICE/OUTPT VISIT, EST, LEVL II, 10-19 MIN    ASSESSMENT AND PLAN:      Problem List Items Addressed This Visit          ID    Contact with and (suspected) exposure to covid-19 - Primary    Current Assessment & Plan     Given exposure to COVID and symptoms  Treat as COVID  Quarantine for 5 days then mask for 5 days. Increase fluid intake and rest! Seek medical attention if symptoms persist or worsen.      Covid is a virus  The current strain of COVID is treated with oral antivirals if you meet criteria  If you do not meet criteria, or wish to not take antivirals, rest, hydrate with water, Tylenol  as needed for fever and body aches, quarantine for 5 days from first day of symptoms  If you are not symptom free after 5 days, return to clinic for recheck  To get the most benefit from a zinc supplement, it is best to take zinc lozenges or syrup within 24 hours of symptoms of cold or virus and take vitamin C supplement  Zinc 40 mg per day is the upper limit for an adult     MUCINEX D 12 hour for sinus pressure              Health Maintenance Topics with due status: Not Due       Topic Last Completion Date    Influenza Vaccine Not Due     Future Appointments   Date Time Provider Department Center   2024  8:30 AM GRIS Garcia [facility] [place]      Follow up if symptoms worsen or fail to improve. or sooner as needed.      CHIEF COMPLAINT: Sore Throat (Pt is here with sore throat, headache, ear pain, and runny nose. No fever noted. It started yesterday morning. She was around someone with covid but has been home testing regulary and last tested negative yesterday.)           HISTORY OF PRESENT ILLNESS:  Ronit Morley is a 22 y.o. female who presents to  the clinic today     Ronit Morley presents to the clinic with Sore Throat (Pt is here with sore throat, headache, ear pain, and runny nose. No fever noted. It started yesterday morning. She was around someone with covid but has been home testing regulary and last tested negative yesterday.)     Known exposure to COVID at local fair recently and symptoms started with sore throat and body aches with low grade fever  Denies diarrhea/nausea  Taken tylenol sinus med with no benefit      Sore Throat   This is a new problem. The current episode started in the past 7 days. The problem has been unchanged. The pain is worse on the right side. The maximum temperature recorded prior to her arrival was 100.4 - 100.9 F. The fever has been present for Less than 1 day. The pain is at a severity of 3/10. The pain is mild. Pertinent negatives include no swollen glands. She has had no exposure to strep or mono. She has tried cool liquids and acetaminophen for the symptoms. The treatment provided mild relief.       PAST MEDICAL HISTORY:      Past Medical History:   Diagnosis Date    Depression     Heart rate fast      PAST SURGICAL HISTORY:  Past Surgical History:   Procedure Laterality Date    TYMPANOSTOMY TUBE PLACEMENT       SOCIAL HISTORY:  Social History     Socioeconomic History    Marital status: Single   Tobacco Use    Smoking status: Never     Passive exposure: Never    Smokeless tobacco: Never   Substance and Sexual Activity    Alcohol use: Never    Drug use: Never    Sexual activity: Yes     FAMILY HISTORY:       Family History   Problem Relation Age of Onset    Hypothyroidism Mother     Hypothyroidism Sister        ALLERGIES AND MEDICATIONS: updated and reviewed.       Review of patient's allergies indicates:   Allergen Reactions    Cefdinir Rash     Medication List with Changes/Refills   Current Medications    ETONOGESTREL-ETHINYL ESTRADIOL (NUVARING) 0.12-0.015 MG/24 HR VAGINAL RING    Place 1 each vaginally every 21  "days. Insert one (1) ring vaginally and leave in place for three (3) weeks, then remove for one (1) week.       SCREENING HISTORY:     Health Maintenance         Date Due Completion Date    Hepatitis C Screening Never done ---    Lipid Panel Never done ---    HPV Vaccines (1 - 2-dose series) Never done ---    Chlamydia Screening Never done ---    TETANUS VACCINE Never done ---    COVID-19 Vaccine (3 - Mixed Product series) 10/18/2021 8/23/2021    HIV Screening 05/26/2028 (Originally 12/2/2017) ---    Influenza Vaccine (1) 09/01/2023 ---            REVIEW OF SYSTEMS:   Review of Systems   Constitutional:  Positive for fever.   HENT:  Positive for sinus pressure/congestion and sore throat.    Respiratory: Negative.     Cardiovascular: Negative.    Gastrointestinal: Negative.          PHYSICAL EXAM:         /78 (BP Location: Right arm, Patient Position: Sitting, BP Method: Medium (Automatic))   Pulse (!) 114   Temp 98.7 °F (37.1 °C) (Oral)   Ht 5' 5" (1.651 m)   Wt 61.7 kg (136 lb)   LMP 07/01/2023 (Exact Date)   SpO2 99%   BMI 22.63 kg/m²  Patient's last menstrual period was 07/01/2023 (exact date).  Wt Readings from Last 3 Encounters:   08/01/23 61.7 kg (136 lb)   06/12/23 62.2 kg (137 lb 3.2 oz)   04/28/23 64.5 kg (142 lb 3.2 oz)     BP Readings from Last 3 Encounters:   08/01/23 139/78   06/12/23 (!) 140/91   04/28/23 116/78     Estimated body mass index is 22.63 kg/m² as calculated from the following:    Height as of this encounter: 5' 5" (1.651 m).    Weight as of this encounter: 61.7 kg (136 lb).     Physical Exam  HENT:      Head: Normocephalic.      Right Ear: A middle ear effusion is present. Tympanic membrane is retracted and bulging.      Left Ear: A middle ear effusion is present. Tympanic membrane is retracted and bulging.      Nose: Mucosal edema present.      Right Sinus: No maxillary sinus tenderness or frontal sinus tenderness.      Left Sinus: No maxillary sinus tenderness or frontal " sinus tenderness.      Comments: Chronic AR with nasal crease/ allergic shiners  Weak water eyes/ red lower conjunctiva       Mouth/Throat:      Lips: Pink.      Pharynx: Oropharynx is clear. Uvula midline. Posterior oropharyngeal erythema present.   Cardiovascular:      Rate and Rhythm: Normal rate and regular rhythm.      Pulses: Normal pulses.      Heart sounds: Normal heart sounds.   Pulmonary:      Effort: Pulmonary effort is normal.   Musculoskeletal:      Cervical back: Neck supple.   Neurological:      Mental Status: She is alert.         LABS:     I have reviewed old labs below:  Lab Results   Component Value Date    WBC 6.04 06/12/2023    HGB 13.7 06/12/2023    HCT 41.5 06/12/2023    MCV 89.2 06/12/2023     06/12/2023     06/12/2023    K 4.5 06/12/2023     (H) 06/12/2023    CALCIUM 9.5 06/12/2023    CO2 23 06/12/2023    GLU 83 06/12/2023    BUN 9 06/12/2023    CREATININE 0.64 06/12/2023    ANIONGAP 11 06/12/2023    TSH 1.880 06/12/2023           Signature:  Sanna Abernathy [address] [place] [address]  Phone #: [phone]  Fax #: [phone]       Date of encounter: 8/1/23    Patient Instructions   Given exposure to COVID and symptoms  Treat as COVID  Quarantine for 5 days then mask for 5 days. Increase fluid intake and rest! Seek medical attention if symptoms persist or worsen.      Covid is a virus  The current strain of COVID is treated with oral antivirals if you meet criteria  If you do not meet criteria, or wish to not take antivirals, rest, hydrate with water, Tylenol  as needed for fever and body aches, quarantine for 5 days from first day of symptoms  If you are not symptom free after 5 days, return to clinic for recheck  To get the most benefit from a zinc supplement, it is best to take zinc lozenges or syrup within 24 hours of symptoms of cold or virus and take vitamin C supplement  Zinc 40 mg per day is the upper  limit for an adult     MUCINEX D 12 hour for sinus pressure

## 2023-08-01 NOTE — ASSESSMENT & PLAN NOTE
Given exposure to COVID and symptoms  Treat as COVID  Quarantine for 5 days then mask for 5 days. Increase fluid intake and rest! Seek medical attention if symptoms persist or worsen.      Covid is a virus  The current strain of COVID is treated with oral antivirals if you meet criteria  If you do not meet criteria, or wish to not take antivirals, rest, hydrate with water, Tylenol  as needed for fever and body aches, quarantine for 5 days from first day of symptoms  If you are not symptom free after 5 days, return to clinic for recheck  To get the most benefit from a zinc supplement, it is best to take zinc lozenges or syrup within 24 hours of symptoms of cold or virus and take vitamin C supplement  Zinc 40 mg per day is the upper limit for an adult     MUCINEX D 12 hour for sinus pressure

## 2023-11-11 ENCOUNTER — OFFICE VISIT (OUTPATIENT)
Dept: FAMILY MEDICINE | Facility: CLINIC | Age: 23
End: 2023-11-11
Payer: COMMERCIAL

## 2023-11-11 VITALS
OXYGEN SATURATION: 100 % | RESPIRATION RATE: 18 BRPM | TEMPERATURE: 99 F | HEART RATE: 84 BPM | BODY MASS INDEX: 20.89 KG/M2 | WEIGHT: 130 LBS | DIASTOLIC BLOOD PRESSURE: 77 MMHG | SYSTOLIC BLOOD PRESSURE: 119 MMHG | HEIGHT: 66 IN

## 2023-11-11 DIAGNOSIS — R10.2 SUPRAPUBIC PAIN: ICD-10-CM

## 2023-11-11 DIAGNOSIS — N30.00 ACUTE CYSTITIS WITHOUT HEMATURIA: Primary | ICD-10-CM

## 2023-11-11 PROBLEM — Z20.822 CONTACT WITH AND (SUSPECTED) EXPOSURE TO COVID-19: Status: RESOLVED | Noted: 2023-08-01 | Resolved: 2023-11-11

## 2023-11-11 LAB
BILIRUB SERPL-MCNC: NEGATIVE MG/DL
BLOOD URINE, POC: ABNORMAL
COLOR, POC UA: YELLOW
GLUCOSE UR QL STRIP: NEGATIVE
KETONES UR QL STRIP: ABNORMAL
LEUKOCYTE ESTERASE URINE, POC: ABNORMAL
NITRITE, POC UA: NEGATIVE
PH, POC UA: 6
PROTEIN, POC: 30
SPECIFIC GRAVITY, POC UA: 1.02
UROBILINOGEN, POC UA: 0.2

## 2023-11-11 PROCEDURE — 81003 URINALYSIS AUTO W/O SCOPE: CPT | Mod: QW,,, | Performed by: NURSE PRACTITIONER

## 2023-11-11 PROCEDURE — 3008F PR BODY MASS INDEX (BMI) DOCUMENTED: ICD-10-PCS | Mod: ,,, | Performed by: NURSE PRACTITIONER

## 2023-11-11 PROCEDURE — 3078F DIAST BP <80 MM HG: CPT | Mod: ,,, | Performed by: NURSE PRACTITIONER

## 2023-11-11 PROCEDURE — 87086 CULTURE, URINE: ICD-10-PCS | Mod: ,,, | Performed by: CLINICAL MEDICAL LABORATORY

## 2023-11-11 PROCEDURE — 96372 THER/PROPH/DIAG INJ SC/IM: CPT | Mod: ,,, | Performed by: NURSE PRACTITIONER

## 2023-11-11 PROCEDURE — 1159F PR MEDICATION LIST DOCUMENTED IN MEDICAL RECORD: ICD-10-PCS | Mod: ,,, | Performed by: NURSE PRACTITIONER

## 2023-11-11 PROCEDURE — 3078F PR MOST RECENT DIASTOLIC BLOOD PRESSURE < 80 MM HG: ICD-10-PCS | Mod: ,,, | Performed by: NURSE PRACTITIONER

## 2023-11-11 PROCEDURE — 3074F PR MOST RECENT SYSTOLIC BLOOD PRESSURE < 130 MM HG: ICD-10-PCS | Mod: ,,, | Performed by: NURSE PRACTITIONER

## 2023-11-11 PROCEDURE — 87086 URINE CULTURE/COLONY COUNT: CPT | Mod: ,,, | Performed by: CLINICAL MEDICAL LABORATORY

## 2023-11-11 PROCEDURE — 81003 POCT URINALYSIS W/O SCOPE: ICD-10-PCS | Mod: QW,,, | Performed by: NURSE PRACTITIONER

## 2023-11-11 PROCEDURE — 96372 PR INJECTION,THERAP/PROPH/DIAG2ST, IM OR SUBCUT: ICD-10-PCS | Mod: ,,, | Performed by: NURSE PRACTITIONER

## 2023-11-11 PROCEDURE — 99213 OFFICE O/P EST LOW 20 MIN: CPT | Mod: 25,,, | Performed by: NURSE PRACTITIONER

## 2023-11-11 PROCEDURE — 3074F SYST BP LT 130 MM HG: CPT | Mod: ,,, | Performed by: NURSE PRACTITIONER

## 2023-11-11 PROCEDURE — 99051 PR MEDICAL SERVICES, EVE/WKEND/HOLIDAY: ICD-10-PCS | Mod: ,,, | Performed by: NURSE PRACTITIONER

## 2023-11-11 PROCEDURE — 3008F BODY MASS INDEX DOCD: CPT | Mod: ,,, | Performed by: NURSE PRACTITIONER

## 2023-11-11 PROCEDURE — 99051 MED SERV EVE/WKEND/HOLIDAY: CPT | Mod: ,,, | Performed by: NURSE PRACTITIONER

## 2023-11-11 PROCEDURE — 1159F MED LIST DOCD IN RCRD: CPT | Mod: ,,, | Performed by: NURSE PRACTITIONER

## 2023-11-11 PROCEDURE — 99213 PR OFFICE/OUTPT VISIT, EST, LEVL III, 20-29 MIN: ICD-10-PCS | Mod: 25,,, | Performed by: NURSE PRACTITIONER

## 2023-11-11 RX ORDER — LINCOMYCIN HYDROCHLORIDE 300 MG/ML
600 INJECTION, SOLUTION INTRAMUSCULAR; INTRAVENOUS; SUBCONJUNCTIVAL
Status: COMPLETED | OUTPATIENT
Start: 2023-11-11 | End: 2023-11-11

## 2023-11-11 RX ORDER — SULFAMETHOXAZOLE AND TRIMETHOPRIM 800; 160 MG/1; MG/1
1 TABLET ORAL 2 TIMES DAILY
Qty: 6 TABLET | Refills: 0 | Status: SHIPPED | OUTPATIENT
Start: 2023-11-11 | End: 2023-11-14

## 2023-11-11 RX ADMIN — LINCOMYCIN HYDROCHLORIDE 600 MG: 300 INJECTION, SOLUTION INTRAMUSCULAR; INTRAVENOUS; SUBCONJUNCTIVAL at 08:11

## 2023-11-11 NOTE — PROGRESS NOTES
GRIS Broussard    [address] [place] [address]     PATIENT NAME: Ronit Morley  : 2000  DATE: 23  MRN: [id number]      Billing Provider: GRIS Broussard  Level of Service: IN OFFICE/OUTPT VISIT, EST, LEVL III, 20-29 MIN    ASSESSMENT AND PLAN:      Problem List Items Addressed This Visit          Renal/    Acute cystitis without hematuria - Primary    Current Assessment & Plan     Lincocin 600 mg IM today  Begin bactrim DS one pill bid for 3 days tonight as first dose  UC ordered and will contact patient with results    Reassurance with blood pressure today         Relevant Medications    lincomycin injection 600 mg (Completed)    sulfamethoxazole-trimethoprim 800-160mg (BACTRIM DS) 800-160 mg Tab    Other Relevant Orders    POCT URINALYSIS W/O SCOPE (Completed)    Urine culture     Other Visit Diagnoses       Suprapubic pain        Relevant Orders    POCT URINALYSIS W/O SCOPE (Completed)    Urine culture            The patient has no Health Maintenance topics of status Not Due  Future Appointments   Date Time Provider Department Center   2024  8:30 AM Sanna Abernathy FNP [facility]      Follow up if symptoms worsen or fail to improve. or sooner as needed.      CHIEF COMPLAINT: Urinary Frequency (Complains of suprapubic discomfort, frequency of urination and thinks she has a possible UTI. ) and Abdominal Pain           HISTORY OF PRESENT ILLNESS:  Ronit Morley is a 22 y.o. female who presents to the clinic today     Ronit Morley presents to the clinic with Urinary Frequency (Complains of suprapubic discomfort, frequency of urination and thinks she has a possible UTI. ) and Abdominal Pain     Urinary Frequency   This is a new problem. The current episode started in the past 7 days (started 2023). The problem occurs every urination. The problem has been waxing and waning. The quality of the pain is described  as aching and burning. The pain is at a severity of 3/10. The pain is mild. There has been no fever. She is Sexually active. There is No history of pyelonephritis. Associated symptoms include frequency and urgency. She has tried increased fluids for the symptoms. The treatment provided no relief.   Abdominal Pain  This is a new problem. The current episode started in the past 7 days. The onset quality is undetermined. The problem occurs intermittently. The problem has been waxing and waning. The pain is located in the suprapubic region. The pain is at a severity of 2/10. The pain is mild. The quality of the pain is aching and cramping. The abdominal pain does not radiate. Associated symptoms include frequency. Nothing aggravates the pain. The pain is relieved by Nothing. She has tried nothing for the symptoms. The treatment provided no relief. Patient's medical history includes UTI.       PAST MEDICAL HISTORY:      Past Medical History:   Diagnosis Date    Depression     Heart rate fast      PAST SURGICAL HISTORY:  Past Surgical History:   Procedure Laterality Date    TYMPANOSTOMY TUBE PLACEMENT       SOCIAL HISTORY:  Social History     Socioeconomic History    Marital status: Single   Tobacco Use    Smoking status: Never     Passive exposure: Never    Smokeless tobacco: Never   Substance and Sexual Activity    Alcohol use: Never    Drug use: Never    Sexual activity: Yes     FAMILY HISTORY:       Family History   Problem Relation Age of Onset    Heart disease Mother     Hypothyroidism Mother     Hypertension Father     Hypothyroidism Sister        ALLERGIES AND MEDICATIONS: updated and reviewed.       Review of patient's allergies indicates:   Allergen Reactions    Cefdinir Rash     Medication List with Changes/Refills   New Medications    SULFAMETHOXAZOLE-TRIMETHOPRIM 800-160MG (BACTRIM DS) 800-160 MG TAB    Take 1 tablet by mouth 2 (two) times daily. for 3 days   Current Medications    ETONOGESTREL-ETHINYL  "ESTRADIOL (NUVARING) 0.12-0.015 MG/24 HR VAGINAL RING    Place 1 each vaginally every 21 days. Insert one (1) ring vaginally and leave in place for three (3) weeks, then remove for one (1) week.       SCREENING HISTORY:     Health Maintenance         Date Due Completion Date    Hepatitis C Screening Never done ---    Lipid Panel Never done ---    HPV Vaccines (1 - 2-dose series) Never done ---    Chlamydia Screening Never done ---    TETANUS VACCINE Never done ---    Influenza Vaccine (1) Never done ---    COVID-19 Vaccine (3 - 2023-24 season) 09/01/2023 8/23/2021    HIV Screening 05/26/2028 (Originally 12/2/2017) ---            REVIEW OF SYSTEMS:   Review of Systems   Gastrointestinal:  Positive for abdominal pain.   Genitourinary:  Positive for frequency and urgency.         PHYSICAL EXAM:         /77 (BP Location: Right arm, Patient Position: Lying, BP Method: Medium (Automatic))   Pulse 84   Temp 99.1 °F (37.3 °C) (Oral)   Resp 18   Ht 5' 6" (1.676 m)   Wt 59 kg (130 lb)   LMP 10/27/2023   SpO2 100%   BMI 20.98 kg/m²  Patient's last menstrual period was 10/27/2023.  Wt Readings from Last 3 Encounters:   11/11/23 59 kg (130 lb)   08/01/23 61.7 kg (136 lb)   06/12/23 62.2 kg (137 lb 3.2 oz)     BP Readings from Last 3 Encounters:   11/11/23 119/77   08/01/23 139/78   06/12/23 (!) 140/91     Estimated body mass index is 20.98 kg/m² as calculated from the following:    Height as of this encounter: 5' 6" (1.676 m).    Weight as of this encounter: 59 kg (130 lb).     Physical Exam  Constitutional:       Appearance: Normal appearance.   Cardiovascular:      Rate and Rhythm: Normal rate and regular rhythm.      Pulses: Normal pulses.   Pulmonary:      Effort: Pulmonary effort is normal.      Breath sounds: Normal breath sounds.   Neurological:      Mental Status: She is alert and oriented to person, place, and time.   Psychiatric:         Mood and Affect: Mood normal.         LABS:     I have reviewed old " labs below:  Lab Results   Component Value Date    WBC 6.04 06/12/2023    HGB 13.7 06/12/2023    HCT 41.5 06/12/2023    MCV 89.2 06/12/2023     06/12/2023     06/12/2023    K 4.5 06/12/2023     (H) 06/12/2023    CALCIUM 9.5 06/12/2023    CO2 23 06/12/2023    GLU 83 06/12/2023    BUN 9 06/12/2023    CREATININE 0.64 06/12/2023    ANIONGAP 11 06/12/2023    TSH 1.880 06/12/2023           Signature:  GRIS Broussard  [address] [place] [address]  Phone #: [phone]  Fax #: [phone]       Date of encounter: 11/11/23    Patient Instructions   Lincocin 600 mg IM today  Bactrim DS one pill by mouth twice a day for 3 days  Complete your oral antibiotic as prescribed  Increase water intake daily with goal of 1/2 body weight in ounces of water each day (ex. If you weight 120 lbs, your goal is 60 ounces of water each day)  Always wipe front to back with each void  Stop to void each time you feel the urge  Avoid constipation with increasing fruits and vegetables in your diet  If a urine culture was ordered, you will be notified once the results are reviewed  If symptoms you are experiencing have not improved within three days, return to the clinic for recheck

## 2023-11-11 NOTE — ASSESSMENT & PLAN NOTE
Lincocin 600 mg IM today  Begin bactrim DS one pill bid for 3 days tonight as first dose  UC ordered and will contact patient with results    Reassurance with blood pressure today

## 2023-11-11 NOTE — PATIENT INSTRUCTIONS
Lincocin 600 mg IM today  Bactrim DS one pill by mouth twice a day for 3 days  Complete your oral antibiotic as prescribed  Increase water intake daily with goal of 1/2 body weight in ounces of water each day (ex. If you weight 120 lbs, your goal is 60 ounces of water each day)  Always wipe front to back with each void  Stop to void each time you feel the urge  Avoid constipation with increasing fruits and vegetables in your diet  If a urine culture was ordered, you will be notified once the results are reviewed  If symptoms you are experiencing have not improved within three days, return to the clinic for recheck

## 2023-11-13 LAB — UA COMPLETE W REFLEX CULTURE PNL UR: NORMAL

## 2023-12-27 ENCOUNTER — OFFICE VISIT (OUTPATIENT)
Dept: FAMILY MEDICINE | Facility: CLINIC | Age: 23
End: 2023-12-27
Payer: COMMERCIAL

## 2023-12-27 VITALS
TEMPERATURE: 99 F | HEART RATE: 101 BPM | HEIGHT: 66 IN | OXYGEN SATURATION: 99 % | DIASTOLIC BLOOD PRESSURE: 93 MMHG | RESPIRATION RATE: 18 BRPM | SYSTOLIC BLOOD PRESSURE: 139 MMHG | WEIGHT: 134 LBS | BODY MASS INDEX: 21.53 KG/M2

## 2023-12-27 DIAGNOSIS — R00.0 TACHYCARDIA WITH HEART RATE 100-120 BEATS PER MINUTE: Primary | ICD-10-CM

## 2023-12-27 DIAGNOSIS — H93.8X3 EAR FULLNESS, BILATERAL: ICD-10-CM

## 2023-12-27 DIAGNOSIS — K21.00 GASTROESOPHAGEAL REFLUX DISEASE WITH ESOPHAGITIS WITHOUT HEMORRHAGE: ICD-10-CM

## 2023-12-27 PROCEDURE — 3080F DIAST BP >= 90 MM HG: CPT | Mod: ,,, | Performed by: NURSE PRACTITIONER

## 2023-12-27 PROCEDURE — 3008F BODY MASS INDEX DOCD: CPT | Mod: ,,, | Performed by: NURSE PRACTITIONER

## 2023-12-27 PROCEDURE — 99214 OFFICE O/P EST MOD 30 MIN: CPT | Mod: ,,, | Performed by: NURSE PRACTITIONER

## 2023-12-27 PROCEDURE — 1160F PR REVIEW ALL MEDS BY PRESCRIBER/CLIN PHARMACIST DOCUMENTED: ICD-10-PCS | Mod: ,,, | Performed by: NURSE PRACTITIONER

## 2023-12-27 PROCEDURE — 1159F MED LIST DOCD IN RCRD: CPT | Mod: ,,, | Performed by: NURSE PRACTITIONER

## 2023-12-27 PROCEDURE — 3008F PR BODY MASS INDEX (BMI) DOCUMENTED: ICD-10-PCS | Mod: ,,, | Performed by: NURSE PRACTITIONER

## 2023-12-27 PROCEDURE — 99214 PR OFFICE/OUTPT VISIT, EST, LEVL IV, 30-39 MIN: ICD-10-PCS | Mod: ,,, | Performed by: NURSE PRACTITIONER

## 2023-12-27 PROCEDURE — 3075F SYST BP GE 130 - 139MM HG: CPT | Mod: ,,, | Performed by: NURSE PRACTITIONER

## 2023-12-27 PROCEDURE — 3080F PR MOST RECENT DIASTOLIC BLOOD PRESSURE >= 90 MM HG: ICD-10-PCS | Mod: ,,, | Performed by: NURSE PRACTITIONER

## 2023-12-27 PROCEDURE — 1160F RVW MEDS BY RX/DR IN RCRD: CPT | Mod: ,,, | Performed by: NURSE PRACTITIONER

## 2023-12-27 PROCEDURE — 1159F PR MEDICATION LIST DOCUMENTED IN MEDICAL RECORD: ICD-10-PCS | Mod: ,,, | Performed by: NURSE PRACTITIONER

## 2023-12-27 PROCEDURE — 3075F PR MOST RECENT SYSTOLIC BLOOD PRESS GE 130-139MM HG: ICD-10-PCS | Mod: ,,, | Performed by: NURSE PRACTITIONER

## 2023-12-27 RX ORDER — PROPRANOLOL HYDROCHLORIDE 10 MG/1
10 TABLET ORAL DAILY
Qty: 30 TABLET | Refills: 0 | Status: SHIPPED | OUTPATIENT
Start: 2023-12-27 | End: 2024-01-09 | Stop reason: SDUPTHER

## 2023-12-27 RX ORDER — OMEPRAZOLE 40 MG/1
40 CAPSULE, DELAYED RELEASE ORAL DAILY
Qty: 30 CAPSULE | Refills: 0 | Status: SHIPPED | OUTPATIENT
Start: 2023-12-27 | End: 2024-01-20

## 2023-12-27 NOTE — PROGRESS NOTES
GRIS Broussard    [address] [place] [address]     PATIENT NAME: Ronit Morley  : 2000  DATE: 23  MRN: [id number]      Billing Provider: GRIS Broussard  Level of Service: [place] OFFICE/OUTPT VISIT, EST, LEVL IV, 30-39 MIN    ASSESSMENT AND PLAN:      Problem List Items Addressed This Visit    None  Visit Diagnoses       Tachycardia with heart rate 100-120 beats per minute    -  Primary    Relevant Medications    propranoloL (INDERAL) 10 MG tablet    Ear fullness, bilateral        Gastroesophageal reflux disease with esophagitis without hemorrhage        Relevant Medications    omeprazole (PRILOSEC) 40 MG capsule        Trial Propranolol 10 mg daily  Monitor and record bp and heart rate daily- not same arm and not same time each day  Bring readings to next visit in 2-3 weeks  Begin omeprazole 40 mg daily for reflux  To stop ear fullness feeling and posterior oropharynx burning/ cobblestone appearance  Reduce carbonated drinks/caffeine      The patient has no Health Maintenance topics of status Not Due  Future Appointments   Date Time Provider Department Center   2024  9:20 AM Sanna Abernathy [facility]   [facility] [place]      No follow-ups on file. or sooner as needed.      CHIEF COMPLAINT: Ear Fullness (Patient complains of bilateral ear discomfort with ear fullness and sinus. Also stated when she was in high school she was on Metoprolol because of her elevated blood pressure and heart rate and took herself off but stated every time she comes in and has random blood pressure check her blood pressure is elevated and her pulse and wants to discuss getting back on the Metoprolol. Does not need any type of work or school excuse. H/O PE tubes in past and stated she has a lot of scar tissue in her ears. )           HISTORY OF PRESENT ILLNESS:  Ronit Morley is a 23 y.o.  female who presents to the clinic today     Ronit Morley presents to the clinic with Ear Fullness (Patient complains of bilateral ear discomfort with ear fullness and sinus. Also stated when she was in high school she was on Metoprolol because of her elevated blood pressure and heart rate and took herself off but stated every time she comes in and has random blood pressure check her blood pressure is elevated and her pulse and wants to discuss getting back on the Metoprolol. Does not need any type of work or school excuse. H/O PE tubes in past and stated she has a lot of scar tissue in her ears. )     History of tachycardia with work up by cardiology via holter monitor  Reported increase heart rate and controlled with metoprolol  Now having increase heart rate: reflux with burning of throat and ear fullness   Feeling of anxious occ   Reviewed previous labs with normal TSH/free T4 from 6/2023    Denies change in appetite nor bad taste in mouth      Ear Fullness   There is pain in both ears. This is a recurrent problem. The current episode started more than 1 month ago. The problem occurs every few minutes. The problem has been waxing and waning. The pain is at a severity of 0/10. The patient is experiencing no pain. She has tried nothing for the symptoms. The treatment provided no relief. Her past medical history is significant for a tympanostomy tube.       PAST MEDICAL HISTORY:      Past Medical History:   Diagnosis Date    Depression     Heart rate fast      PAST SURGICAL HISTORY:  Past Surgical History:   Procedure Laterality Date    TYMPANOSTOMY TUBE PLACEMENT       SOCIAL HISTORY:  Social History     Socioeconomic History    Marital status: Single   Tobacco Use    Smoking status: Never     Passive exposure: Never    Smokeless tobacco: Never   Substance and Sexual Activity    Alcohol use: Never    Drug use: Never    Sexual activity: Yes   Social History Narrative    [employer] in [employer]. Planning to get   "and move to [place]     FAMILY HISTORY:       Family History   Problem Relation Age of Onset    Heart disease Mother     Hypothyroidism Mother     Hypertension Father     Hypothyroidism Sister        ALLERGIES AND MEDICATIONS: updated and reviewed.       Review of patient's allergies indicates:   Allergen Reactions    Cefdinir Rash     Medication List with Changes/Refills   New Medications    OMEPRAZOLE (PRILOSEC) 40 MG CAPSULE    Take 1 capsule (40 mg total) by mouth once daily.    PROPRANOLOL (INDERAL) 10 MG TABLET    Take 1 tablet (10 mg total) by mouth once daily.   Current Medications    ETONOGESTREL-ETHINYL ESTRADIOL (NUVARING) 0.12-0.015 MG/24 HR VAGINAL RING    Place 1 each vaginally every 21 days. Insert one (1) ring vaginally and leave in place for three (3) weeks, then remove for one (1) week.       SCREENING HISTORY:     Health Maintenance         Date Due Completion Date    Hepatitis C Screening Never done ---    Lipid Panel Never done ---    HPV Vaccines (1 - 2-dose series) Never done ---    Chlamydia Screening Never done ---    TETANUS VACCINE Never done ---    Influenza Vaccine (1) Never done ---    COVID-19 Vaccine (3 - 2023-24 season) 09/01/2023 8/23/2021    Pap Smear 12/02/2023 ---    HIV Screening 05/26/2028 (Originally 12/2/2017) ---            REVIEW OF SYSTEMS:   Review of Systems   Constitutional: Negative.    Respiratory: Negative.     Cardiovascular: Negative.    Gastrointestinal:  Positive for reflux.   Genitourinary: Negative.    Psychiatric/Behavioral:  The patient is nervous/anxious.          PHYSICAL EXAM:         BP (!) 139/93 (BP Location: Right arm, Patient Position: Sitting, BP Method: Large (Automatic))   Pulse 101   Temp 99 °F (37.2 °C) (Oral)   Resp 18   Ht 5' 6" (1.676 m)   Wt 60.8 kg (134 lb)   LMP 12/20/2023   SpO2 99%   BMI 21.63 kg/m²  Patient's last menstrual period was 12/20/2023.  Wt Readings from Last 3 Encounters:   12/27/23 60.8 kg (134 lb)   11/11/23 59 kg " "(130 lb)   08/01/23 61.7 kg (136 lb)     BP Readings from Last 3 Encounters:   12/27/23 (!) 139/93   11/11/23 119/77   08/01/23 139/78     Estimated body mass index is 21.63 kg/m² as calculated from the following:    Height as of this encounter: 5' 6" (1.676 m).    Weight as of this encounter: 60.8 kg (134 lb).     Physical Exam  Constitutional:       Appearance: She is normal weight.   HENT:      Head: Normocephalic.      Right Ear: Ear canal and external ear normal.      Left Ear: Ear canal and external ear normal.      Ears:      Comments: Scarring to bilateral TM's       Nose:      Comments: Chronic hypertrophic turbinates pale     Mouth/Throat:      Comments: Cobblestone appearance to posterior oropharynx  Cardiovascular:      Rate and Rhythm: Regular rhythm. Tachycardia present.      Pulses: Normal pulses.      Heart sounds: Normal heart sounds.   Pulmonary:      Effort: Pulmonary effort is normal.      Breath sounds: Normal breath sounds.   Musculoskeletal:      Cervical back: Neck supple.   Skin:     General: Skin is warm.   Neurological:      Mental Status: She is alert and oriented to person, place, and time.         LABS:     I have reviewed old labs below:  Lab Results   Component Value Date    WBC 6.04 06/12/2023    HGB 13.7 06/12/2023    HCT 41.5 06/12/2023    MCV 89.2 06/12/2023     06/12/2023     06/12/2023    K 4.5 06/12/2023     (H) 06/12/2023    CALCIUM 9.5 06/12/2023    CO2 23 06/12/2023    GLU 83 06/12/2023    BUN 9 06/12/2023    CREATININE 0.64 06/12/2023    ANIONGAP 11 06/12/2023    TSH 1.880 06/12/2023           Signature:  GRIS Broussard  [address] [place] [address]  Phone #: [phone]  Fax #: [phone]       Date of encounter: 12/27/23    There are no Patient Instructions on file for this visit.     "

## 2024-01-09 DIAGNOSIS — R00.0 TACHYCARDIA WITH HEART RATE 100-120 BEATS PER MINUTE: ICD-10-CM

## 2024-01-09 NOTE — TELEPHONE ENCOUNTER
----- Message from Yoselin Rousseau sent at 1/8/2024  3:37 PM CST -----  Regarding: Med Refill  Patient, Ronit Morley, called and advised that she came in a few weeks ago and got a prescription for Propranolol. She noticed it wasn't 30 day supply and is wondering if some can be called in until she can make her next appointment on 2/6. She has a limited schedule and lives in [place] so she is saying a sooner appointment isn't possible right now. Sent her to the [facility] as well to leave her information. Thank you.      Spoke with patient via telephone. Patient state she has enough medication until 1/27/2024. Patient state she will like a refill on prescription due to being in school and not able to come until 2/6/2024. Patient state she do not want to run out of medication and will like some to be sent in to [facility] in [place]. Informed pt Sanna will not be back until Thursday and will address this then. Patient verbalized understanding.

## 2024-01-17 RX ORDER — PROPRANOLOL HYDROCHLORIDE 10 MG/1
10 TABLET ORAL DAILY
Qty: 30 TABLET | Refills: 0 | Status: SHIPPED | OUTPATIENT
Start: 2024-01-17 | End: 2024-02-21

## 2024-01-20 DIAGNOSIS — K21.00 GASTROESOPHAGEAL REFLUX DISEASE WITH ESOPHAGITIS WITHOUT HEMORRHAGE: ICD-10-CM

## 2024-01-20 RX ORDER — OMEPRAZOLE 40 MG/1
40 CAPSULE, DELAYED RELEASE ORAL
Qty: 30 CAPSULE | Refills: 0 | Status: SHIPPED | OUTPATIENT
Start: 2024-01-20

## 2024-02-21 ENCOUNTER — OFFICE VISIT (OUTPATIENT)
Dept: FAMILY MEDICINE | Facility: CLINIC | Age: 24
End: 2024-02-21
Payer: COMMERCIAL

## 2024-02-21 VITALS
OXYGEN SATURATION: 100 % | RESPIRATION RATE: 16 BRPM | TEMPERATURE: 99 F | HEIGHT: 66 IN | SYSTOLIC BLOOD PRESSURE: 129 MMHG | DIASTOLIC BLOOD PRESSURE: 84 MMHG | WEIGHT: 134 LBS | BODY MASS INDEX: 21.53 KG/M2 | HEART RATE: 93 BPM

## 2024-02-21 DIAGNOSIS — F41.9 ANXIETY: Primary | ICD-10-CM

## 2024-02-21 DIAGNOSIS — R00.0 TACHYCARDIA WITH HEART RATE 100-120 BEATS PER MINUTE: ICD-10-CM

## 2024-02-21 LAB
EKG 12-LEAD: NORMAL
PR INTERVAL: NORMAL
PRT AXES: NORMAL
QRS DURATION: NORMAL
QT/QTC: NORMAL
VENTRICULAR RATE: NORMAL

## 2024-02-21 PROCEDURE — 3079F DIAST BP 80-89 MM HG: CPT | Mod: ,,, | Performed by: NURSE PRACTITIONER

## 2024-02-21 PROCEDURE — 1159F MED LIST DOCD IN RCRD: CPT | Mod: ,,, | Performed by: NURSE PRACTITIONER

## 2024-02-21 PROCEDURE — 99214 OFFICE O/P EST MOD 30 MIN: CPT | Mod: ,,, | Performed by: NURSE PRACTITIONER

## 2024-02-21 PROCEDURE — 1160F RVW MEDS BY RX/DR IN RCRD: CPT | Mod: ,,, | Performed by: NURSE PRACTITIONER

## 2024-02-21 PROCEDURE — 3008F BODY MASS INDEX DOCD: CPT | Mod: ,,, | Performed by: NURSE PRACTITIONER

## 2024-02-21 PROCEDURE — 3074F SYST BP LT 130 MM HG: CPT | Mod: ,,, | Performed by: NURSE PRACTITIONER

## 2024-02-21 PROCEDURE — 93000 ELECTROCARDIOGRAM COMPLETE: CPT | Mod: ,,, | Performed by: NURSE PRACTITIONER

## 2024-02-21 RX ORDER — METOPROLOL SUCCINATE 25 MG/1
25 TABLET, EXTENDED RELEASE ORAL DAILY
Qty: 30 TABLET | Refills: 1 | Status: SHIPPED | OUTPATIENT
Start: 2024-02-21 | End: 2024-04-16

## 2024-02-21 NOTE — PROGRESS NOTES
Kellee Santiago DNP, GRIS    [address] [place] [address]     PATIENT NAME: Ronit Morley  : 2000  DATE: 24  MRN: [id number]      Billing Provider: Kellee Santiago DNP, FNP  Level of Service:   Patient PCP Information       Provider PCP Type    GRIS [facility]            Reason for Visit / Chief Complaint: Follow-up (Patient is here for a follow up after starting Propraolol at her last visit. Denies any problems since starting it except her feeling tired and shortness of breath late at night and early in the morning. Would like to discuss starting an extended release medication instead.)       Update PCP  Update Chief Complaint         History of Present Illness / Problem Focused Workflow     Ronit Morley presents to the clinic with Follow-up (Patient is here for a follow up after starting Propraolol at her last visit. Denies any problems since starting it except her feeling tired and shortness of breath late at night and early in the morning. Would like to discuss starting an extended release medication instead.)     Follow-up  Pertinent negatives include no abdominal pain, arthralgias, change in bowel habit, chest pain, chills, congestion, coughing, fatigue, fever, headaches, myalgias, nausea, rash, sore throat, vomiting or weakness.       Review of Systems     Review of Systems   Constitutional:  Negative for activity change, appetite change, chills, fatigue and fever.   HENT:  Negative for nasal congestion, ear pain, hearing loss, postnasal drip and sore throat.    Respiratory:  Negative for cough, chest tightness, shortness of breath and wheezing.    Cardiovascular:  Negative for chest pain, palpitations, leg swelling and claudication.   Gastrointestinal:  Negative for abdominal pain, change in bowel habit, constipation, diarrhea, nausea and vomiting.   Genitourinary:  Negative for dysuria.   Musculoskeletal:  Negative for arthralgias, back  "pain, gait problem and myalgias.   Integumentary:  Negative for rash.   Neurological:  Negative for weakness and headaches.   Psychiatric/Behavioral:  Negative for suicidal ideas. The patient is not nervous/anxious.         Medical / Social / Family History     Past Medical History:   Diagnosis Date    Depression     Heart rate fast        Past Surgical History:   Procedure Laterality Date    TYMPANOSTOMY TUBE PLACEMENT         Social History  Ms. Ronit Morley  reports that she has never smoked. She has never been exposed to tobacco smoke. She has never used smokeless tobacco. She reports that she does not drink alcohol and does not use drugs.    Family History  Ms. Ronit Morley's family history includes Heart disease in her mother; Hypertension in her father; Hypothyroidism in her mother and sister.    Medications and Allergies     Medications  Outpatient Medications Marked as Taking for the 2/21/24 encounter (Office Visit) with Kellee Santiago, DORIS, FNP   Medication Sig Dispense Refill    etonogestreL-ethinyl estradioL (NUVARING) 0.12-0.015 mg/24 hr vaginal ring Place 1 each vaginally every 21 days. Insert one (1) ring vaginally and leave in place for three (3) weeks, then remove for one (1) week. 1 each 11    [DISCONTINUED] propranoloL (INDERAL) 10 MG tablet Take 1 tablet (10 mg total) by mouth once daily. 30 tablet 0       Allergies  Review of patient's allergies indicates:   Allergen Reactions    Cefdinir Rash       Physical Examination     Vitals:    02/21/24 0959 02/21/24 1102 02/21/24 1103 02/21/24 1104   BP: 124/87 128/78 131/81 129/84   BP Location: Left arm Right arm Right arm Right arm   Patient Position: Sitting Lying Sitting Standing   BP Method: Medium (Automatic) Medium (Automatic) Medium (Automatic) Medium (Automatic)   Pulse: 86 84 87 93   Resp: 16      Temp: 98.6 °F (37 °C)      TempSrc: Oral      SpO2: 100%      Weight: 60.8 kg (134 lb)      Height: 5' 6" (1.676 m)        Physical Exam  Vitals " and nursing note reviewed.   Constitutional:       General: She is not in acute distress.     Appearance: Normal appearance. She is not ill-appearing.   Eyes:      Extraocular Movements: Extraocular movements intact.      Pupils: Pupils are equal, round, and reactive to light.   Cardiovascular:      Rate and Rhythm: Normal rate and regular rhythm.      Heart sounds: Normal heart sounds.   Pulmonary:      Effort: Pulmonary effort is normal.      Breath sounds: Normal breath sounds.   Abdominal:      General: Bowel sounds are normal.      Palpations: Abdomen is soft.   Musculoskeletal:         General: Normal range of motion.   Skin:     Findings: No rash.   Neurological:      General: No focal deficit present.      Mental Status: She is alert and oriented to person, place, and time. Mental status is at baseline.   Psychiatric:         Mood and Affect: Mood normal.         Behavior: Behavior normal.          Assessment and Plan (including Health Maintenance)      Problem List  Smart SnowBall  Document Outside HM   :    Plan:         Health Maintenance Due   Topic Date Due    Hepatitis C Screening  Never done    Lipid Panel  Never done    HPV Vaccines (1 - 2-dose series) Never done    Chlamydia Screening  Never done    TETANUS VACCINE  Never done    Influenza Vaccine (1) Never done    COVID-19 Vaccine (3 - 2023-24 season) 09/01/2023    Pap Smear  Never done       Problem List Items Addressed This Visit    None  Visit Diagnoses       Anxiety    -  Primary    Tachycardia with heart rate 100-120 beats per minute        Relevant Medications    metoprolol succinate (TOPROL-XL) 25 MG 24 hr tablet    Other Relevant Orders    POCT EKG 12-LEAD (Manually Resulted by Ordering Provider) (Completed)          Anxiety    Tachycardia with heart rate 100-120 beats per minute  -     metoprolol succinate (TOPROL-XL) 25 MG 24 hr tablet; Take 1 tablet (25 mg total) by mouth once daily.  Dispense: 30 tablet; Refill: 1  -     POCT EKG 12-LEAD  (Manually Resulted by Ordering Provider)       The patient has no Health Maintenance topics of status Not Due      Future Appointments   Date Time Provider Department Center   7/18/2024  8:30 AM Sanna Abernathy, [facility] OSBALDO Keith        Follow up in about 4 weeks (around 3/20/2024).     Signature:  Kellee Santiago DNP, FNP  [address] [place] [address]  Phone #: [phone]  Fax #: [phone]    Date of encounter: 2/21/24    Patient Instructions   Will change to extended release beta blocker and have patient monitor symptoms. Follow up in 1-2 months or if symptoms worsen.

## 2024-02-22 ENCOUNTER — PATIENT OUTREACH (OUTPATIENT)
Dept: ADMINISTRATIVE | Facility: HOSPITAL | Age: 24
End: 2024-02-22

## 2024-02-22 NOTE — PROGRESS NOTES
Population Health Chart Review & Patient Outreach Details    Per [facility] website, insurance is active and pt is listed on the attributed list needing a healthy you performed in   HY scheduled for 2024        Further Action Needed If Patient Returns Outreach:            Updates Requested / Reviewed:     []  Care Everywhere    []     []  External Sources ([facility], [facility], [facility], etc.)    [] [facility]   [] [facility]   [] Other:    []  Care Team Updated   []  Removed  or Duplicate Orders   []  Immunization Reconciliation Completed / Queried    [] [place]   [] [place]   [] [place]   [] [place]      Health Maintenance Topics Addressed and Outreach Outcomes / Actions Taken:             Breast Cancer Screening []  Mammogram Order Placed    []  Mammogram Screening Scheduled    []  External Records Requested & Care Team Updated if Applicable    []  External Records Uploaded & Care Team Updated if Applicable    []  Pt Declined Scheduling Mammogram    []  Pt Will Schedule with External Provider / Order Routed & Care Team Updated if Applicable              Cervical Cancer Screening []  Pap Smear Scheduled in Primary Care or OBGYN    []  External Records Requested & Care Team Updated if Applicable       []  External Records Uploaded, Care Team Updated, & History Updated if Applicable    []  Patient Declined Scheduling Pap Smear    []  Patient Will Schedule with External Provider & Care Team Updated if Applicable                  Colorectal Cancer Screening []  Colonoscopy Case Request / Referral / Home Test Order Placed    []  External Records Requested & Care Team Updated if Applicable    []  External Records Uploaded, Care Team Updated, & History Updated if Applicable    []  Patient Declined Completing Colon Cancer Screening    []  Patient Will Schedule with External Provider & Care Team Updated if Applicable    []  Fit Kit Mailed (add the SmartPhrase under additional notes)    []  Reminded Patient to  Complete Home Test                Diabetic Eye Exam []  Eye Exam Screening Order Placed    []  Eye Camera Scheduled or Optometry/Ophthalmology Referral Placed    []  External Records Requested & Care Team Updated if Applicable    []  External Records Uploaded, Care Team Updated, & History Updated if Applicable    []  Patient Declined Scheduling Eye Exam    []  Patient Will Schedule with External Provider & Care Team Updated if Applicable             Blood Pressure Control []  Primary Care Follow Up Visit Scheduled     []  Remote Blood Pressure Reading Captured    []  Patient Declined Remote Reading or Scheduling Appt - Escalated to PCP    []  Patient Will Call Back or Send Portal Message with Reading                 HbA1c & Other Labs []  Overdue Lab(s) Ordered    []  Overdue Lab(s) Scheduled    []  External Records Uploaded & Care Team Updated if Applicable    []  Primary Care Follow Up Visit Scheduled     []  Reminded Patient to Complete A1c Home Test    []  Patient Declined Scheduling Labs or Will Call Back to Schedule    []  Patient Will Schedule with External Provider / Order Routed, & Care Team Updated if Applicable           Primary Care Appointment []  Primary Care Appt Scheduled    []  Patient Declined Scheduling or Will Call Back to Schedule    []  Pt Established with External Provider, Updated Care Team, & Informed Pt to Notify Payor if Applicable           Medication Adherence /    Statin Use []  Primary Care Appointment Scheduled    []  Patient Reminded to  Prescription    []  Patient Declined, Provider Notified if Needed    []  Sent Provider Message to Review to Evaluate Pt for Statin, Add Exclusion Dx Codes, Document   Exclusion in Problem List, Change Statin Intensity Level to Moderate or High Intensity if Applicable                Osteoporosis Screening []  Dexa Order Placed    []  Dexa Appointment Scheduled    []  External Records Requested & Care Team Updated    []  External Records  Uploaded, Care Team Updated, & History Updated if Applicable    []  Patient Declined Scheduling Dexa or Will Call Back to Schedule    []  Patient Will Schedule with External Provider / Order Routed & Care Team Updated if Applicable       Additional Notes:

## 2024-02-25 NOTE — PATIENT INSTRUCTIONS
Will change to extended release beta blocker and have patient monitor symptoms. Follow up in 1-2 months or if symptoms worsen.

## 2024-04-14 DIAGNOSIS — R00.0 TACHYCARDIA WITH HEART RATE 100-120 BEATS PER MINUTE: ICD-10-CM

## 2024-04-16 RX ORDER — METOPROLOL SUCCINATE 25 MG/1
25 TABLET, EXTENDED RELEASE ORAL
Qty: 90 TABLET | Refills: 1 | Status: SHIPPED | OUTPATIENT
Start: 2024-04-16